# Patient Record
Sex: MALE | Race: WHITE | NOT HISPANIC OR LATINO
[De-identification: names, ages, dates, MRNs, and addresses within clinical notes are randomized per-mention and may not be internally consistent; named-entity substitution may affect disease eponyms.]

---

## 2017-01-10 ENCOUNTER — RX RENEWAL (OUTPATIENT)
Age: 72
End: 2017-01-10

## 2017-01-10 ENCOUNTER — APPOINTMENT (OUTPATIENT)
Dept: RHEUMATOLOGY | Facility: CLINIC | Age: 72
End: 2017-01-10

## 2017-01-10 DIAGNOSIS — I10 ESSENTIAL (PRIMARY) HYPERTENSION: ICD-10-CM

## 2017-01-30 ENCOUNTER — RX RENEWAL (OUTPATIENT)
Age: 72
End: 2017-01-30

## 2017-04-18 ENCOUNTER — RX RENEWAL (OUTPATIENT)
Age: 72
End: 2017-04-18

## 2017-07-09 ENCOUNTER — FORM ENCOUNTER (OUTPATIENT)
Age: 72
End: 2017-07-09

## 2017-07-10 ENCOUNTER — OUTPATIENT (OUTPATIENT)
Dept: OUTPATIENT SERVICES | Facility: HOSPITAL | Age: 72
LOS: 1 days | End: 2017-07-10
Payer: MEDICARE

## 2017-07-10 ENCOUNTER — APPOINTMENT (OUTPATIENT)
Dept: MRI IMAGING | Facility: CLINIC | Age: 72
End: 2017-07-10

## 2017-07-10 DIAGNOSIS — Z00.8 ENCOUNTER FOR OTHER GENERAL EXAMINATION: ICD-10-CM

## 2017-07-10 DIAGNOSIS — Z98.89 OTHER SPECIFIED POSTPROCEDURAL STATES: Chronic | ICD-10-CM

## 2017-07-10 PROCEDURE — 72148 MRI LUMBAR SPINE W/O DYE: CPT

## 2017-07-20 ENCOUNTER — OUTPATIENT (OUTPATIENT)
Dept: OUTPATIENT SERVICES | Facility: HOSPITAL | Age: 72
LOS: 1 days | Discharge: HOME | End: 2017-07-20
Payer: MEDICARE

## 2017-07-20 DIAGNOSIS — Z98.89 OTHER SPECIFIED POSTPROCEDURAL STATES: Chronic | ICD-10-CM

## 2017-07-20 DIAGNOSIS — M54.16 RADICULOPATHY, LUMBAR REGION: ICD-10-CM

## 2017-07-20 PROCEDURE — 77003 FLUOROGUIDE FOR SPINE INJECT: CPT

## 2017-07-20 PROCEDURE — 62323 NJX INTERLAMINAR LMBR/SAC: CPT

## 2017-07-27 ENCOUNTER — OUTPATIENT (OUTPATIENT)
Dept: OUTPATIENT SERVICES | Facility: HOSPITAL | Age: 72
LOS: 1 days | End: 2017-07-27
Payer: MEDICARE

## 2017-07-27 DIAGNOSIS — M54.16 RADICULOPATHY, LUMBAR REGION: ICD-10-CM

## 2017-07-27 DIAGNOSIS — Z98.89 OTHER SPECIFIED POSTPROCEDURAL STATES: Chronic | ICD-10-CM

## 2017-07-27 PROCEDURE — 77003 FLUOROGUIDE FOR SPINE INJECT: CPT

## 2017-07-27 PROCEDURE — 62323 NJX INTERLAMINAR LMBR/SAC: CPT

## 2017-09-15 ENCOUNTER — EMERGENCY (EMERGENCY)
Facility: HOSPITAL | Age: 72
LOS: 0 days | Discharge: HOME | End: 2017-09-16
Attending: EMERGENCY MEDICINE | Admitting: EMERGENCY MEDICINE
Payer: MEDICARE

## 2017-09-15 VITALS
WEIGHT: 179.9 LBS | SYSTOLIC BLOOD PRESSURE: 159 MMHG | DIASTOLIC BLOOD PRESSURE: 86 MMHG | RESPIRATION RATE: 18 BRPM | OXYGEN SATURATION: 100 % | TEMPERATURE: 98 F | HEART RATE: 66 BPM | HEIGHT: 66.5 IN

## 2017-09-15 DIAGNOSIS — K08.89 OTHER SPECIFIED DISORDERS OF TEETH AND SUPPORTING STRUCTURES: ICD-10-CM

## 2017-09-15 DIAGNOSIS — Z98.89 OTHER SPECIFIED POSTPROCEDURAL STATES: Chronic | ICD-10-CM

## 2017-09-15 PROCEDURE — 99283 EMERGENCY DEPT VISIT LOW MDM: CPT | Mod: 25

## 2017-09-15 NOTE — ED ADULT TRIAGE NOTE - CHIEF COMPLAINT QUOTE
left lower tooth pain. started at 10pm saw dentist yesterday and he filed down tooth which felt better after, no abscess per dentist. all of a sudden tonight tooth hurts again left lower tooth pain. started at 10pm saw dentist yesterday and he filed down tooth which felt better after, no abscess per dentist. all of a sudden tonight tooth hurts again. took 5mg oxycodone 30 minutes PTA

## 2017-09-16 VITALS
RESPIRATION RATE: 16 BRPM | OXYGEN SATURATION: 99 % | TEMPERATURE: 98 F | SYSTOLIC BLOOD PRESSURE: 142 MMHG | DIASTOLIC BLOOD PRESSURE: 81 MMHG | HEART RATE: 72 BPM

## 2017-09-16 RX ORDER — MORPHINE SULFATE 50 MG/1
6 CAPSULE, EXTENDED RELEASE ORAL ONCE
Qty: 0 | Refills: 0 | Status: DISCONTINUED | OUTPATIENT
Start: 2017-09-16 | End: 2017-09-16

## 2017-09-16 RX ADMIN — MORPHINE SULFATE 6 MILLIGRAM(S): 50 CAPSULE, EXTENDED RELEASE ORAL at 00:33

## 2017-09-16 NOTE — ED PROVIDER NOTE - MUSCULOSKELETAL, MLM
Spine appears normal, range of motion is not limited, no muscle or joint tenderness.  CASTANO x 4.  No focal swelling, tender.

## 2017-09-16 NOTE — ED PROVIDER NOTE - PROGRESS NOTE DETAILS
Dr. Menon:  Pt declines offer of dental block w/ bupivacaine, requests injection of morphine for pain control. Dr. Menon:  Reevaluated patient at bedside.  Patient feeling much improved, requests D/C.  Discussed the results of all diagnostic testing in ED and copies of all reports given.   An opportunity to ask questions was given.  Discussed the importance of prompt, close medical follow-up.  Patient will return with any changes, concerns or persistent / worsening symptoms.  Understanding of all instructions verbalized.

## 2017-09-16 NOTE — ED ADULT NURSE NOTE - OBJECTIVE STATEMENT
presents to the ED with left dental pain. states that he saw his dentist 2 days ago and everything was negative. pain was tolerable for the last day but became increasly worse this evening.

## 2017-09-16 NOTE — ED PROVIDER NOTE - CONSTITUTIONAL, MLM
normal... Elderly WM, awake, alert, oriented to person, place, time/situation, no respiratory discomfort, + moderate acute distress due to pain, + tearful.

## 2017-09-16 NOTE — ED PROVIDER NOTE - CHPI ED SYMPTOMS NEG
no nasal congestion/no fever/no bleeding gums/no headache/no ear pain/no decreased eating/drinking/no mouth sores

## 2017-09-16 NOTE — ED ADULT NURSE NOTE - CHIEF COMPLAINT QUOTE
left lower tooth pain. started at 10pm saw dentist yesterday and he filed down tooth which felt better after, no abscess per dentist. all of a sudden tonight tooth hurts again. took 5mg oxycodone 30 minutes PTA

## 2017-09-16 NOTE — ED PROVIDER NOTE - PMH
BPH (benign prostatic hyperplasia)    CAD (coronary artery disease)    Herniated lumbar intervertebral disc    Hypertension    Mitral valve regurgitation    Osteoarthrosis    Sarcoidosis    Sepsis  unrosepsis

## 2017-09-16 NOTE — ED PROVIDER NOTE - DIAGNOSIS COUNSELING, MDM
conducted a detailed discussion... I had a detailed discussion with the patient and wife regarding the historical points, exam findings, and any diagnostic results supporting the admit diagnosis.

## 2017-09-16 NOTE — ED PROVIDER NOTE - MEDICAL DECISION MAKING DETAILS
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   No known injury, prior dental work.  Panorex by DDS earlier same day negative, + relief after tooth filed but pain recurred after dinner, no relief by po oxycodone.  L lower premolar/ 1st molar + TTP, gingiva normal.  Plan: IV morphine, pt declined dental block w/ bupivicaine.  Observe, reassess.

## 2017-09-16 NOTE — ED PROVIDER NOTE - OBJECTIVE STATEMENT
Exam begun at 00:00. Exam begun at 00:00.  ED chart completed .  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  Gradual onset pain L lower tooth, #19, progressively worsening since .  Partial alleviation by po oxycodone.  DDS evaluation earlier today: Panorex done: no abscess obvious, DDS filed down the tooth somewhat w/ + sympt. relief.  No h/o tooth grinding nor any recent dental work done prior.  Pain recurred ~ 9:30 PM after returnd home from dinner (no hard/crunchy foods taken).  Pt reports severe, sharp/shooting pain from the same tooth, no relief tonight by po oxycodone (+ 5 yrs. ).   Alls: PCN, sulfa.  PCP: CASI Jacobson.

## 2017-09-16 NOTE — ED PROVIDER NOTE - ENMT, MLM
Airway patent, Nasal mucosa clear. Mouth with normal mucosa. Throat has no vesicles, no oropharyngeal exudates and uvula is midline.  No facial swelling.  L lower premolar: w/ + metal cap in place, + percussive tender; 1st molar also + TTP, surrounding gingiva no swelling/ discoloration/ bleeding.

## 2017-09-16 NOTE — ED PROVIDER NOTE - NEUROLOGICAL, MLM
Alert and oriented, no focal deficits, no motor or sensory deficits.  CNs II - XII intact.  Normal speech.

## 2017-12-11 ENCOUNTER — RX RENEWAL (OUTPATIENT)
Age: 72
End: 2017-12-11

## 2017-12-11 DIAGNOSIS — H10.13 ACUTE ATOPIC CONJUNCTIVITIS, BILATERAL: ICD-10-CM

## 2018-01-25 DIAGNOSIS — Z87.09 PERSONAL HISTORY OF OTHER DISEASES OF THE RESPIRATORY SYSTEM: ICD-10-CM

## 2018-12-04 ENCOUNTER — RX RENEWAL (OUTPATIENT)
Age: 73
End: 2018-12-04

## 2019-03-15 DIAGNOSIS — M12.9 ARTHROPATHY, UNSPECIFIED: ICD-10-CM

## 2019-05-15 ENCOUNTER — OTHER (OUTPATIENT)
Age: 74
End: 2019-05-15

## 2019-05-17 ENCOUNTER — APPOINTMENT (OUTPATIENT)
Dept: NEUROSURGERY | Facility: CLINIC | Age: 74
End: 2019-05-17
Payer: MEDICARE

## 2019-05-17 DIAGNOSIS — M51.9 UNSPECIFIED THORACIC, THORACOLUMBAR AND LUMBOSACRAL INTERVERTEBRAL DISC DISORDER: ICD-10-CM

## 2019-05-17 DIAGNOSIS — Z78.9 OTHER SPECIFIED HEALTH STATUS: ICD-10-CM

## 2019-05-17 DIAGNOSIS — M54.16 RADICULOPATHY, LUMBAR REGION: ICD-10-CM

## 2019-05-17 PROCEDURE — 99203 OFFICE O/P NEW LOW 30 MIN: CPT

## 2019-05-21 ENCOUNTER — OUTPATIENT (OUTPATIENT)
Dept: OUTPATIENT SERVICES | Facility: HOSPITAL | Age: 74
LOS: 1 days | End: 2019-05-21
Payer: MEDICARE

## 2019-05-21 DIAGNOSIS — Z98.89 OTHER SPECIFIED POSTPROCEDURAL STATES: Chronic | ICD-10-CM

## 2019-05-21 DIAGNOSIS — M54.16 RADICULOPATHY, LUMBAR REGION: ICD-10-CM

## 2019-05-21 PROCEDURE — 77003 FLUOROGUIDE FOR SPINE INJECT: CPT

## 2019-05-21 PROCEDURE — 62323 NJX INTERLAMINAR LMBR/SAC: CPT

## 2019-05-23 VITALS
TEMPERATURE: 98.3 F | HEART RATE: 69 BPM | HEIGHT: 65.35 IN | SYSTOLIC BLOOD PRESSURE: 177 MMHG | DIASTOLIC BLOOD PRESSURE: 75 MMHG | WEIGHT: 220.2 LBS | BODY MASS INDEX: 36.25 KG/M2

## 2019-05-23 PROBLEM — Z78.9 NO PERTINENT PAST MEDICAL HISTORY: Status: RESOLVED | Noted: 2019-05-23 | Resolved: 2019-05-23

## 2019-05-23 PROBLEM — Z78.9 ALCOHOL INGESTION: Status: ACTIVE | Noted: 2019-05-23

## 2019-05-23 PROBLEM — M51.9 LUMBAR DISC DISEASE: Status: ACTIVE | Noted: 2019-05-15

## 2019-05-23 NOTE — FAMILY HISTORY
[] :  [Alive] : alive [FreeTextEntry1] : multiple myeloma, myesthenia gravis [FreeTextEntry2] : systemic sclerosis

## 2019-05-23 NOTE — REASON FOR VISIT
[New Patient Visit] : a new patient visit [FreeTextEntry1] : past history of lumbar disc herniation. New acute left sciatica after golfing.

## 2019-05-23 NOTE — CONSULT LETTER
[Dear  ___] : Dear  [unfilled], [Sincerely,] : Sincerely, [FreeTextEntry2] : Tyler Jacobson\[facility] [address] [place] [id number] [FreeTextEntry1] : I have seen your patient Anam Frias in my office to evaluate his recent problem with acute sharp lower back pain with radiation down the left leg. 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 The patient reports onset of current symptoms approximately 10 days ago after playing 9 holes of golf. He has been troubled with shooting stabbing pains from the lower back into the buttock region and down the leg to the foot. He reports numbness in the toes on the left-hand side. He has a weakness. He denies any changes in bowel and bladder function. He has been started on Neurontin 200 mg 3 times daily and a Medrol Dosepak. He is about 4 days into this treatment and not yet received significant benefit. The patient indicates his pain is currently a 7 to a 9/10\par \par I have reviewed with the patient and his recent MRI images which identify a degenerative spondylotic changes from L2-S1. The previous disc herniation at L4-5 it is identified. There is some left lateral recess compromise. In addition there is evidence of a disc herniation at L5-S1.\par \par On examination of the patient is clearly in distress with lower back pain and radiating symptoms to the buttock region on the left. The patient has some pain to palpation in this region. There is a positive straight leg test on the left. Flexion and external rotation does not specifically increase his pain. Internal rotation does cause sharp pain. There is some moderate pain to palpation of the iliotibial band on the left-hand side. The patient has specifically decreased sensation in the top of the foot especially the first 2 toes on the left-hand side. There is weakness of extensor hallucis longus on the left-hand side. The patient's reflexes are symmetric and normal at the patella there is an absent left ankle reflex. There is no clonus.\par \par I've had an extended discussion with the patient regarding his treatment options. We have reviewed the natural history of a disc herniation. I've reassured the patient that 90% of acute disc herniations with sciatica will resolve spontaneously within 6-12 weeks. In the interval use of pain medication, a muscle relaxant, gabapentin, and steroids may all be of benefit. If the patient does not notice steady progressive improvement the use of an epidural steroid injection may also be of assistance. Currently I would not recommend surgical intervention but this may be necessary if symptoms do not resolve after appropriate conservative treatment over a 12-16 week. And if sensory and strength symptoms persist. Currently I do not recommend active physical therapy until his acute pain is slightly improved. I will see the patient again in 2 weeks to evaluate his progress and response to care.\par \par Thank you for very kindly including me in the evaluation and treatment of your patient. Please do not hesitate to contact me should any questions or concerns regarding this evaluation, the diagnosis, where the recommendation for current conservative care measures. [FreeTextEntry3] : Edison Charles MD, PhD\par  of Neurosurgery\par [facility]\par \par \par

## 2019-12-10 DIAGNOSIS — Z00.00 ENCOUNTER FOR GENERAL ADULT MEDICAL EXAMINATION W/OUT ABNORMAL FINDINGS: ICD-10-CM

## 2019-12-10 DIAGNOSIS — M06.4 INFLAMMATORY POLYARTHROPATHY: ICD-10-CM

## 2019-12-12 ENCOUNTER — TRANSCRIPTION ENCOUNTER (OUTPATIENT)
Age: 74
End: 2019-12-12

## 2019-12-13 LAB
24R-OH-CALCIDIOL SERPL-MCNC: 47.4 PG/ML
25(OH)D3 SERPL-MCNC: 55.8 NG/ML
ACE BLD-CCNC: 35 U/L
ALBUMIN MFR SERPL ELPH: 67.6 %
ALBUMIN SERPL ELPH-MCNC: 4.7 G/DL
ALBUMIN SERPL-MCNC: 4.5 G/DL
ALBUMIN/GLOB SERPL: 2 RATIO
ALP BLD-CCNC: 50 U/L
ALPHA1 GLOB MFR SERPL ELPH: 3.4 %
ALPHA1 GLOB SERPL ELPH-MCNC: 0.2 G/DL
ALPHA2 GLOB MFR SERPL ELPH: 7.7 %
ALPHA2 GLOB SERPL ELPH-MCNC: 0.5 G/DL
ALT SERPL-CCNC: 16 U/L
ANION GAP SERPL CALC-SCNC: 17 MMOL/L
AST SERPL-CCNC: 26 U/L
B-GLOBULIN MFR SERPL ELPH: 10.3 %
B-GLOBULIN SERPL ELPH-MCNC: 0.7 G/DL
BASOPHILS # BLD AUTO: 0.04 K/UL
BASOPHILS NFR BLD AUTO: 0.7 %
BILIRUB SERPL-MCNC: 0.6 MG/DL
BUN SERPL-MCNC: 15 MG/DL
C3 SERPL-MCNC: 96 MG/DL
C4 SERPL-MCNC: 22 MG/DL
CALCIUM SERPL-MCNC: 9.7 MG/DL
CENTROMERE IGG SER-ACNC: <0.2 CD:130001892
CHLORIDE SERPL-SCNC: 102 MMOL/L
CHOLEST SERPL-MCNC: 193 MG/DL
CHOLEST/HDLC SERPL: 2.1 RATIO
CK SERPL-CCNC: 61 U/L
CO2 SERPL-SCNC: 27 MMOL/L
CREAT SERPL-MCNC: 1.01 MG/DL
CRP SERPL-MCNC: 0.68 MG/DL
DSDNA AB SER-ACNC: <12 IU/ML
ENA RNP AB SER IA-ACNC: >8 AL
ENA SCL70 IGG SER IA-ACNC: 1.7 AL
ENA SM AB SER IA-ACNC: <0.2 AL
ENA SS-A AB SER IA-ACNC: 0.2 AL
ENA SS-B AB SER IA-ACNC: <0.2 AL
EOSINOPHIL # BLD AUTO: 0.2 K/UL
EOSINOPHIL NFR BLD AUTO: 3.3 %
ERYTHROCYTE [SEDIMENTATION RATE] IN BLOOD BY WESTERGREN METHOD: 32 MM/HR
ESTIMATED AVERAGE GLUCOSE: 103 MG/DL
FERRITIN SERPL-MCNC: 341 NG/ML
GAMMA GLOB FLD ELPH-MCNC: 0.7 G/DL
GAMMA GLOB MFR SERPL ELPH: 11 %
GLUCOSE SERPL-MCNC: 90 MG/DL
HBA1C MFR BLD HPLC: 5.2 %
HCT VFR BLD CALC: 41.1 %
HDLC SERPL-MCNC: 94 MG/DL
HGB BLD-MCNC: 14 G/DL
IMM GRANULOCYTES NFR BLD AUTO: 0.5 %
INTERPRETATION SERPL IEP-IMP: NORMAL
LDLC SERPL CALC-MCNC: 84 MG/DL
LYMPHOCYTES # BLD AUTO: 1.53 K/UL
LYMPHOCYTES NFR BLD AUTO: 25.2 %
MAN DIFF?: NORMAL
MCHC RBC-ENTMCNC: 34 PG
MCHC RBC-ENTMCNC: 34.1 GM/DL
MCV RBC AUTO: 99.8 FL
MONOCYTES # BLD AUTO: 0.78 K/UL
MONOCYTES NFR BLD AUTO: 12.8 %
NEUTROPHILS # BLD AUTO: 3.5 K/UL
NEUTROPHILS NFR BLD AUTO: 57.5 %
PLATELET # BLD AUTO: 206 K/UL
POTASSIUM SERPL-SCNC: 3.8 MMOL/L
PROT SERPL-MCNC: 6.7 G/DL
PSA FREE FLD-MCNC: 18 %
PSA FREE SERPL-MCNC: 0.54 NG/ML
PSA SERPL-MCNC: 3.07 NG/ML
RBC # BLD: 4.12 M/UL
RBC # FLD: 13.2 %
RHEUMATOID FACT SER QL: 10 IU/ML
SODIUM SERPL-SCNC: 146 MMOL/L
TRIGL SERPL-MCNC: 77 MG/DL
TSH SERPL-ACNC: 2.17 UIU/ML
URATE SERPL-MCNC: 6.1 MG/DL
WBC # FLD AUTO: 6.08 K/UL

## 2019-12-19 LAB
ANA PAT FLD IF-IMP: ABNORMAL
ANA SER IF-ACNC: ABNORMAL
CCP AB SER IA-ACNC: 9 UNITS
RF+CCP IGG SER-IMP: NEGATIVE

## 2019-12-24 ENCOUNTER — RX RENEWAL (OUTPATIENT)
Age: 74
End: 2019-12-24

## 2019-12-24 RX ORDER — LOSARTAN POTASSIUM 100 MG/1
100 TABLET, FILM COATED ORAL
Qty: 90 | Refills: 1 | Status: ACTIVE | COMMUNITY
Start: 2019-12-24 | End: 1900-01-01

## 2019-12-24 RX ORDER — ATENOLOL 50 MG/1
50 TABLET ORAL
Qty: 90 | Refills: 1 | Status: ACTIVE | COMMUNITY
Start: 2017-01-10 | End: 1900-01-01

## 2019-12-24 RX ORDER — DOXAZOSIN 4 MG/1
4 TABLET ORAL DAILY
Qty: 90 | Refills: 3 | Status: ACTIVE | COMMUNITY
Start: 2019-12-24 | End: 1900-01-01

## 2020-11-17 ENCOUNTER — OUTPATIENT (OUTPATIENT)
Dept: OUTPATIENT SERVICES | Facility: HOSPITAL | Age: 75
LOS: 1 days | End: 2020-11-17
Payer: MEDICARE

## 2020-11-17 DIAGNOSIS — Z11.59 ENCOUNTER FOR SCREENING FOR OTHER VIRAL DISEASES: ICD-10-CM

## 2020-11-17 DIAGNOSIS — Z98.89 OTHER SPECIFIED POSTPROCEDURAL STATES: Chronic | ICD-10-CM

## 2020-11-17 LAB — SARS-COV-2 RNA SPEC QL NAA+PROBE: SIGNIFICANT CHANGE UP

## 2020-11-17 PROCEDURE — U0003: CPT

## 2020-11-19 ENCOUNTER — OUTPATIENT (OUTPATIENT)
Dept: OUTPATIENT SERVICES | Facility: HOSPITAL | Age: 75
LOS: 1 days | End: 2020-11-19
Payer: MEDICARE

## 2020-11-19 DIAGNOSIS — M54.16 RADICULOPATHY, LUMBAR REGION: ICD-10-CM

## 2020-11-19 DIAGNOSIS — Z98.89 OTHER SPECIFIED POSTPROCEDURAL STATES: Chronic | ICD-10-CM

## 2020-11-19 PROCEDURE — 62323 NJX INTERLAMINAR LMBR/SAC: CPT

## 2020-12-16 PROBLEM — Z87.09 HISTORY OF INFLUENZA: Status: RESOLVED | Noted: 2018-01-25 | Resolved: 2020-12-16

## 2021-01-14 RX ORDER — AMLODIPINE BESYLATE 10 MG/1
10 TABLET ORAL
Qty: 90 | Refills: 3 | Status: ACTIVE | COMMUNITY
Start: 2017-01-10 | End: 1900-01-01

## 2021-02-01 ENCOUNTER — RX RENEWAL (OUTPATIENT)
Age: 76
End: 2021-02-01

## 2021-02-01 RX ORDER — FINASTERIDE 5 MG/1
5 TABLET, FILM COATED ORAL DAILY
Qty: 90 | Refills: 2 | Status: ACTIVE | COMMUNITY
Start: 2017-04-18 | End: 1900-01-01

## 2021-03-10 NOTE — ED PROVIDER NOTE - TEMPLATE, MLM
Problem: Nutritional:  Goal: Achieve adequate nutritional intake  Description: Patient will consume >50% of meals  Outcome: PROGRESSING AS EXPECTED      Dental

## 2021-08-17 ENCOUNTER — TRANSCRIPTION ENCOUNTER (OUTPATIENT)
Age: 76
End: 2021-08-17

## 2023-04-18 ENCOUNTER — OFFICE (OUTPATIENT)
Dept: URBAN - METROPOLITAN AREA CLINIC 102 | Facility: CLINIC | Age: 78
Setting detail: OPHTHALMOLOGY
End: 2023-04-18
Payer: MEDICARE

## 2023-04-18 DIAGNOSIS — H25.13: ICD-10-CM

## 2023-04-18 DIAGNOSIS — H40.033: ICD-10-CM

## 2023-04-18 DIAGNOSIS — H43.811: ICD-10-CM

## 2023-04-18 DIAGNOSIS — H35.363: ICD-10-CM

## 2023-04-18 DIAGNOSIS — H16.223: ICD-10-CM

## 2023-04-18 DIAGNOSIS — H40.023: ICD-10-CM

## 2023-04-18 PROCEDURE — 92014 COMPRE OPH EXAM EST PT 1/>: CPT | Performed by: OPHTHALMOLOGY

## 2023-04-18 PROCEDURE — 92020 GONIOSCOPY: CPT | Performed by: OPHTHALMOLOGY

## 2023-04-18 PROCEDURE — 92134 CPTRZ OPH DX IMG PST SGM RTA: CPT | Performed by: OPHTHALMOLOGY

## 2023-04-18 ASSESSMENT — REFRACTION_AUTOREFRACTION
OD_SPHERE: -0.75
OD_CYLINDER: -1.25
OS_CYLINDER: -1.75
OD_AXIS: 046
OS_SPHERE: -0.50
OS_AXIS: 113

## 2023-04-18 ASSESSMENT — TONOMETRY
OD_IOP_MMHG: 15
OD_IOP_MMHG: 21
OS_IOP_MMHG: 21
OS_IOP_MMHG: 16

## 2023-04-18 ASSESSMENT — REFRACTION_CURRENTRX
OS_SPHERE: -0.50
OD_CYLINDER: -1.25
OS_ADD: +2.50
OD_SPHERE: -1.00
OS_OVR_VA: 20/
OD_CYLINDER: -1.25
OD_ADD: +2.50
OD_OVR_VA: 20/
OS_AXIS: 124
OD_AXIS: 90
OD_ADD: +2.50
OS_CYLINDER: -2.00
OD_VPRISM_DIRECTION: PROGS
OS_SPHERE: -1.00
OS_AXIS: 110
OS_AXIS: 120
OD_ADD: +2.50
OS_VPRISM_DIRECTION: PROGS
OD_SPHERE: -0.75
OS_VPRISM_DIRECTION: PROGS
OS_SPHERE: -0.50
OS_ADD: +2.50
OS_OVR_VA: 20/
OS_ADD: +2.50
OD_VPRISM_DIRECTION: PROGS
OD_CYLINDER: -1.25
OS_CYLINDER: -1.00
OD_OVR_VA: 20/
OD_OVR_VA: 20/
OD_SPHERE: -1.25
OD_AXIS: 65
OS_OVR_VA: 20/
OD_AXIS: 80
OS_CYLINDER: -2.00

## 2023-04-18 ASSESSMENT — SPHEQUIV_DERIVED
OD_SPHEQUIV: -1.625
OS_SPHEQUIV: -1.375
OS_SPHEQUIV: -1.5
OD_SPHEQUIV: -1.375
OD_SPHEQUIV: -1.5
OS_SPHEQUIV: -1.75
OS_SPHEQUIV: -1.375
OD_SPHEQUIV: -1.625

## 2023-04-18 ASSESSMENT — REFRACTION_MANIFEST
OS_AXIS: 120
OS_AXIS: 109
OD_SPHERE: -1.00
OD_SPHERE: -0.75
OD_VA1: 20/20
OD_AXIS: 75
OS_SPHERE: -0.50
OD_VA1: 20/25
OD_CYLINDER: -1.25
OS_ADD: +2.50
OS_VA1: 20/25
OS_AXIS: 120
OD_SPHERE: -1.00
OS_SPHERE: -0.50
OD_AXIS: 80
OS_CYLINDER: -1.75
OD_ADD: +2.50
OD_CYLINDER: -1.25
OD_ADD: +2.50
OS_CYLINDER: -2.00
OS_SPHERE: -0.75
OS_CYLINDER: -2.00
OS_VA1: 20/40
OS_VA1: 20/25
OD_AXIS: 80
OS_ADD: +2.50
OD_CYLINDER: -1.50

## 2023-04-18 ASSESSMENT — PACHYMETRY
OD_CT_UM: 563
OD_CT_CORRECTION: -1
OS_CT_UM: 565
OS_CT_CORRECTION: -1

## 2023-04-18 ASSESSMENT — AXIALLENGTH_DERIVED
OD_AL: 24.0025
OD_AL: 23.9522
OS_AL: 23.7615
OS_AL: 23.9107
OS_AL: 23.7615
OD_AL: 24.0025
OS_AL: 23.811
OD_AL: 23.9021

## 2023-04-18 ASSESSMENT — KERATOMETRY
OD_AXISANGLE_DEGREES: 164
OD_K1POWER_DIOPTERS: 43.75
OS_K1POWER_DIOPTERS: 44.00
OS_AXISANGLE_DEGREES: 029
METHOD_AUTO_MANUAL: AUTO
OD_K2POWER_DIOPTERS: 44.50
OS_K2POWER_DIOPTERS: 45.00

## 2023-04-18 ASSESSMENT — VISUAL ACUITY
OS_BCVA: 20/40
OD_BCVA: 20/50

## 2023-04-18 ASSESSMENT — CONFRONTATIONAL VISUAL FIELD TEST (CVF)
OD_FINDINGS: FULL
OS_FINDINGS: FULL

## 2023-04-18 ASSESSMENT — TEAR BREAK UP TIME (TBUT)
OS_TBUT: 2+
OD_TBUT: 1+

## 2023-06-09 DIAGNOSIS — M25.562 PAIN IN LEFT KNEE: ICD-10-CM

## 2023-06-09 DIAGNOSIS — M23.305 OTHER MENISCUS DERANGEMENTS, UNSPECIFIED MEDIAL MENISCUS, UNSPECIFIED KNEE: ICD-10-CM

## 2023-06-22 ENCOUNTER — APPOINTMENT (OUTPATIENT)
Dept: MRI IMAGING | Facility: CLINIC | Age: 78
End: 2023-06-22
Payer: MEDICARE

## 2023-06-22 ENCOUNTER — OUTPATIENT (OUTPATIENT)
Dept: OUTPATIENT SERVICES | Facility: HOSPITAL | Age: 78
LOS: 1 days | End: 2023-06-22
Payer: MEDICARE

## 2023-06-22 DIAGNOSIS — M23.305 OTHER MENISCUS DERANGEMENTS, UNSPECIFIED MEDIAL MENISCUS, UNSPECIFIED KNEE: ICD-10-CM

## 2023-06-22 DIAGNOSIS — M25.562 PAIN IN LEFT KNEE: ICD-10-CM

## 2023-06-22 DIAGNOSIS — Z98.89 OTHER SPECIFIED POSTPROCEDURAL STATES: Chronic | ICD-10-CM

## 2023-06-22 PROCEDURE — 73721 MRI JNT OF LWR EXTRE W/O DYE: CPT | Mod: 26,LT,MH

## 2023-06-22 PROCEDURE — 73721 MRI JNT OF LWR EXTRE W/O DYE: CPT

## 2023-10-01 PROBLEM — M54.16 LUMBAR RADICULAR PAIN: Status: ACTIVE | Noted: 2017-07-10

## 2023-10-05 ENCOUNTER — APPOINTMENT (OUTPATIENT)
Dept: RADIOLOGY | Facility: CLINIC | Age: 78
End: 2023-10-05
Payer: MEDICARE

## 2023-10-05 ENCOUNTER — OUTPATIENT (OUTPATIENT)
Dept: OUTPATIENT SERVICES | Facility: HOSPITAL | Age: 78
LOS: 1 days | End: 2023-10-05
Payer: MEDICARE

## 2023-10-05 DIAGNOSIS — Z98.89 OTHER SPECIFIED POSTPROCEDURAL STATES: Chronic | ICD-10-CM

## 2023-10-05 DIAGNOSIS — Z00.8 ENCOUNTER FOR OTHER GENERAL EXAMINATION: ICD-10-CM

## 2023-10-05 PROCEDURE — 71046 X-RAY EXAM CHEST 2 VIEWS: CPT | Mod: 26

## 2023-10-05 PROCEDURE — 71046 X-RAY EXAM CHEST 2 VIEWS: CPT

## 2023-10-24 ENCOUNTER — APPOINTMENT (OUTPATIENT)
Dept: RHEUMATOLOGY | Facility: CLINIC | Age: 78
End: 2023-10-24
Payer: MEDICARE

## 2023-10-24 DIAGNOSIS — M47.819 SPONDYLOSIS W/OUT MYELOPATHY OR RADICULOPATHY, SITE UNSPECIFIED: ICD-10-CM

## 2023-10-24 PROCEDURE — 99442: CPT | Mod: 95

## 2023-10-31 ENCOUNTER — OFFICE (OUTPATIENT)
Dept: URBAN - METROPOLITAN AREA CLINIC 102 | Facility: CLINIC | Age: 78
Setting detail: OPHTHALMOLOGY
End: 2023-10-31
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
## 2023-10-31 PROCEDURE — NO SHOW FE NO SHOW FEE: Performed by: OPHTHALMOLOGY

## 2023-11-03 ENCOUNTER — LABORATORY RESULT (OUTPATIENT)
Age: 78
End: 2023-11-03

## 2023-11-05 LAB
24R-OH-CALCIDIOL SERPL-MCNC: 19.1 PG/ML
25(OH)D3 SERPL-MCNC: 42.4 NG/ML
ALBUMIN SERPL ELPH-MCNC: 4.3 G/DL
ALP BLD-CCNC: 58 U/L
ALT SERPL-CCNC: 29 U/L
ANION GAP SERPL CALC-SCNC: 11 MMOL/L
AST SERPL-CCNC: 29 U/L
BILIRUB SERPL-MCNC: 0.4 MG/DL
BUN SERPL-MCNC: 15 MG/DL
C3 SERPL-MCNC: 102 MG/DL
C4 SERPL-MCNC: 21 MG/DL
CALCIUM SERPL-MCNC: 9.5 MG/DL
CCP AB SER IA-ACNC: 8 UNITS
CHLORIDE SERPL-SCNC: 101 MMOL/L
CK SERPL-CCNC: 35 U/L
CO2 SERPL-SCNC: 32 MMOL/L
CREAT SERPL-MCNC: 1.07 MG/DL
CRP SERPL-MCNC: 7 MG/L
EGFR: 72 ML/MIN/1.73M2
ERYTHROCYTE [SEDIMENTATION RATE] IN BLOOD BY WESTERGREN METHOD: 38 MM/HR
FERRITIN SERPL-MCNC: 365 NG/ML
GLUCOSE SERPL-MCNC: 100 MG/DL
HAV IGM SER QL: NONREACTIVE
HBV CORE IGM SER QL: NONREACTIVE
HBV SURFACE AG SER QL: NONREACTIVE
HCT VFR BLD CALC: 44.8 %
HCV AB SER QL: NONREACTIVE
HCV S/CO RATIO: 0.11 S/CO
HGB BLD-MCNC: 15.2 G/DL
MCHC RBC-ENTMCNC: 33.9 GM/DL
MCHC RBC-ENTMCNC: 34.6 PG
MCV RBC AUTO: 102.1 FL
PLATELET # BLD AUTO: 206 K/UL
POTASSIUM SERPL-SCNC: 3.5 MMOL/L
PROT SERPL-MCNC: 6.3 G/DL
RBC # BLD: 4.39 M/UL
RBC # FLD: 12.6 %
RF+CCP IGG SER-IMP: NEGATIVE
RHEUMATOID FACT SER QL: <10 IU/ML
SODIUM SERPL-SCNC: 144 MMOL/L
TSH SERPL-ACNC: 1.75 UIU/ML
WBC # FLD AUTO: 9.68 K/UL

## 2023-11-09 ENCOUNTER — APPOINTMENT (OUTPATIENT)
Dept: RHEUMATOLOGY | Facility: CLINIC | Age: 78
End: 2023-11-09
Payer: MEDICARE

## 2023-11-09 VITALS
BODY MASS INDEX: 38.98 KG/M2 | DIASTOLIC BLOOD PRESSURE: 70 MMHG | SYSTOLIC BLOOD PRESSURE: 130 MMHG | TEMPERATURE: 97.8 F | HEIGHT: 63 IN | HEART RATE: 68 BPM | WEIGHT: 220 LBS | OXYGEN SATURATION: 98 %

## 2023-11-09 PROCEDURE — 96372 THER/PROPH/DIAG INJ SC/IM: CPT

## 2023-11-09 PROCEDURE — 99215 OFFICE O/P EST HI 40 MIN: CPT | Mod: 25

## 2023-11-09 RX ORDER — METHYLPRED ACET/NACL,ISO-OS/PF 80 MG/ML
80 VIAL (ML) INJECTION
Qty: 1 | Refills: 0 | Status: COMPLETED | OUTPATIENT
Start: 2023-11-09

## 2023-11-09 RX ADMIN — METHYLPREDNISOLONE ACETATE MG/ML: 80 INJECTION, SUSPENSION INTRA-ARTICULAR; INTRALESIONAL; INTRAMUSCULAR; SOFT TISSUE at 00:00

## 2023-11-10 LAB — ENA SCL70 IGG SER IA-ACNC: 1.3 AL

## 2023-11-10 RX ORDER — OSELTAMIVIR PHOSPHATE 75 MG/1
75 CAPSULE ORAL TWICE DAILY
Qty: 10 | Refills: 3 | Status: DISCONTINUED | COMMUNITY
Start: 2018-01-25 | End: 2023-11-10

## 2023-11-10 RX ORDER — PREDNISONE 20 MG/1
20 TABLET ORAL
Qty: 10 | Refills: 3 | Status: DISCONTINUED | COMMUNITY
Start: 2019-12-12 | End: 2023-11-10

## 2023-11-10 RX ORDER — FOLIC ACID 1 MG/1
1 TABLET ORAL DAILY
Qty: 90 | Refills: 3 | Status: ACTIVE | COMMUNITY
Start: 2023-11-10 | End: 1900-01-01

## 2023-11-10 RX ORDER — AZELASTINE HYDROCHLORIDE 137 UG/1
0.1 SPRAY, METERED NASAL DAILY
Qty: 3 | Refills: 6 | Status: DISCONTINUED | COMMUNITY
Start: 2018-12-04 | End: 2023-11-10

## 2023-11-10 RX ORDER — ALPRAZOLAM 0.5 MG/1
0.5 TABLET ORAL
Qty: 2 | Refills: 0 | Status: DISCONTINUED | COMMUNITY
Start: 2017-07-10 | End: 2023-11-10

## 2023-11-10 RX ORDER — CETIRIZINE HYDROCHLORIDE 10 MG/1
10 TABLET, COATED ORAL
Refills: 0 | Status: ACTIVE | OUTPATIENT
Start: 2023-11-10 | End: 1900-01-01

## 2023-11-10 RX ORDER — GABAPENTIN 100 MG/1
100 CAPSULE ORAL 3 TIMES DAILY
Qty: 126 | Refills: 1 | Status: DISCONTINUED | COMMUNITY
Start: 2019-05-15 | End: 2023-11-10

## 2023-11-10 RX ORDER — GOLIMUMAB 50 MG/4ML
50 SOLUTION INTRAVENOUS
Refills: 0 | Status: ACTIVE | OUTPATIENT
Start: 2023-11-10 | End: 1900-01-01

## 2023-11-10 RX ORDER — METHYLPREDNISOLONE 40 MG/ML
40 INJECTION, POWDER, LYOPHILIZED, FOR SOLUTION INTRAMUSCULAR; INTRAVENOUS
Refills: 0 | Status: ACTIVE | OUTPATIENT
Start: 2023-11-10 | End: 1900-01-01

## 2023-11-10 RX ORDER — OLOPATADINE HCL 1 MG/ML
0.1 SOLUTION/ DROPS OPHTHALMIC TWICE DAILY
Qty: 1 | Refills: 3 | Status: DISCONTINUED | COMMUNITY
Start: 2017-12-11 | End: 2023-11-10

## 2023-11-10 RX ORDER — METHYLPREDNISOLONE 4 MG/1
4 TABLET ORAL
Qty: 1 | Refills: 1 | Status: DISCONTINUED | COMMUNITY
Start: 2019-05-15 | End: 2023-11-10

## 2023-11-10 RX ADMIN — Medication 0 MG: at 00:00

## 2023-11-10 RX ADMIN — METHYLPREDNISOLONE 1 MG: 40 INJECTION, POWDER, LYOPHILIZED, FOR SOLUTION INTRAMUSCULAR; INTRAVENOUS at 00:00

## 2023-11-10 RX ADMIN — CETIRIZINE HYDROCHLORIDE 1 MG: 10 TABLET, COATED ORAL at 00:00

## 2023-11-10 RX ADMIN — GOLIMUMAB 0 MG/4ML: 50 SOLUTION INTRAVENOUS at 00:00

## 2023-11-16 ENCOUNTER — LABORATORY RESULT (OUTPATIENT)
Age: 78
End: 2023-11-16

## 2023-11-20 LAB
AA PROT SER-MCNC: 9.5 UG/ML
ACE BLD-CCNC: 32 U/L
ANAPLASMA PHAGOCYTOPHILIA IGG ANTIBODIES: NEGATIVE
ANAPLASMA PHAGOCYTOPHILIA IGM ANTIBODIES: NEGATIVE
BABESIA ANTIBODIES, IGG: NORMAL
BABESIA ANTIBODIES, IGM: NORMAL
BABESIA RESULT COMMENT: NORMAL
BIOMARKER COMMENT: NORMAL
CENTROMERE IGG SER-ACNC: <0.2 AL
CRP SERPL-MCNC: 7 MG/L
DSDNA AB SER-ACNC: 14 IU/ML
EGF SERPL-MCNC: 100 PG/ML
EHRLICIA CHAFFEENIS IGG ANTIBODIES: NEGATIVE
EHRLICIA CHAFFEENIS IGM ANTIBODIES: NEGATIVE
ENA RNP AB SER IA-ACNC: 7.8 AL
ENA SM AB SER IA-ACNC: <0.2 AL
ERHLICIA RESULT COMMENT: NORMAL
FOOTNOTE: NORMAL
HAV IGM SER QL: NONREACTIVE
HBV CORE IGM SER QL: NONREACTIVE
HBV SURFACE AG SER QL: NONREACTIVE
HCV AB SER QL: NONREACTIVE
HCV S/CO RATIO: 0.12 S/CO
IL6 SERPL-MCNC: 14 PG/ML
LEPTIN SERPL-MCNC: 22 NG/ML
Lab: NORMAL
Lab: NORMAL
M TB IFN-G BLD-IMP: NEGATIVE
MMP-1 SERPL-MCNC: 19 NG/ML
MMP-3 SERPL-MCNC: 130 NG/ML
MYELOPEROXIDASE AB SER QL IA: <5 UNITS
MYELOPEROXIDASE CELLS FLD QL: NEGATIVE
PROTEINASE3 AB SER IA-ACNC: <5 UNITS
PROTEINASE3 AB SER-ACNC: NEGATIVE
QUANTIFERON TB PLUS MITOGEN MINUS NIL: 1.34 IU/ML
QUANTIFERON TB PLUS NIL: 0.02 IU/ML
QUANTIFERON TB PLUS TB1 MINUS NIL: 0 IU/ML
QUANTIFERON TB PLUS TB2 MINUS NIL: 0 IU/ML
RA DAS LEVEL QL VECTRADA: NORMAL
RESISTIN SERPL-MCNC: 6.8 NG/ML
RISK OF RADIOGRAPHIC PROGRESS: 6 %
TNFRSF1A SERPL-MCNC: 1.7 NG/ML
VAP-1 SERPL-MCNC: 1.1 UG/ML
VECTRA SCORE: 43
VEGF-A SERPL-MCNC: 390 PG/ML
YKL-40 RESULT: 130 NG/ML

## 2023-11-24 LAB
ANTI-BETA2 GLYCOPROTEIN 1 IGG CONCENTRATION: 2 U/ML
ANTI-BETA2 GLYCOPROTEIN 1 IGM CONCENTRATION: <2.4 U/ML
ANTI-CARDIOLIPIN IGG CONCENTRATION: 2 GPL
ANTI-CARDIOLIPIN IGM CONCENTRATION: 2 MPL
ANTI-CENP IGG CONCENTRATION: 1 U/ML
ANTI-CYCLIC CITRULLINATED PEPTIDE IGG CONCENTRATION: 2 U/ML
ANTI-DOUBLE-STRANDED DNA IGG CONCENTRATION: 55 IU/ML
ANTI-JO-1 IGG CONCENTRATION: 0 U/ML
ANTI-NUCLEAR ANTIBODIES - CYTOPLASMIC PATTERN: NORMAL
ANTI-NUCLEAR ANTIBODIES - PRIMARY NUCLEAR PATTERN: NORMAL
ANTI-NUCLEAR ANTIBODIES - PRIMARY PATTERN TITER: ABNORMAL
ANTI-NUCLEAR ANTIBODIES IGG CONCENTRATION: 87 UNITS
ANTI-RNA POL III IGG CONCENTRATION: <0.7 U/ML
ANTI-RNP70 IGG CONCENTRATION: 1 U/ML
ANTI-RO52 IGG CONCENTRATION: 9 U/ML
ANTI-RO60 IGG CONCENTRATION: 6 U/ML
ANTI-SCL-70 IGG CONCENTRATION: 1 U/ML
ANTI-SMITH IGG CONCENTRATION: <0.7 U/ML
ANTI-SS-B (LA) IGG CONCENTRATION: 1 U/ML
ANTI-THYROGLOBULIN IGG CONCENTRATION: 137 IU/ML
ANTI-THYROID PEROXIDASE IGG CONCENTRATION: 4 IU/ML
ANTI-U1RNP IGG CONCENTRATION: 7 U/ML
AVISE LUPUS INDEX: -1
AVISE LUPUS RESULT: NEGATIVE
B-LYMPHOCYTE-BOUND C4D (BC4D) LEVEL: 12
ERYTHROCYTE-BOUND C4D (EC4D) LEVEL: 2
RHEUMATOID FACTOR (IGA) CONCENTRATION: 3 IU/ML
RHEUMATOID FACTOR (IGM) CONCENTRATION: 1 IU/ML

## 2023-12-12 ENCOUNTER — APPOINTMENT (OUTPATIENT)
Dept: RHEUMATOLOGY | Facility: CLINIC | Age: 78
End: 2023-12-12
Payer: MEDICARE

## 2023-12-12 VITALS
OXYGEN SATURATION: 97 % | DIASTOLIC BLOOD PRESSURE: 93 MMHG | SYSTOLIC BLOOD PRESSURE: 180 MMHG | TEMPERATURE: 97.1 F | HEART RATE: 68 BPM

## 2023-12-12 VITALS — SYSTOLIC BLOOD PRESSURE: 160 MMHG | DIASTOLIC BLOOD PRESSURE: 87 MMHG | OXYGEN SATURATION: 95 % | HEART RATE: 63 BPM

## 2023-12-12 PROCEDURE — 36415 COLL VENOUS BLD VENIPUNCTURE: CPT

## 2023-12-12 PROCEDURE — 96374 THER/PROPH/DIAG INJ IV PUSH: CPT | Mod: 59

## 2023-12-12 PROCEDURE — 96365 THER/PROPH/DIAG IV INF INIT: CPT

## 2023-12-12 RX ORDER — GOLIMUMAB 50 MG/4ML
50 SOLUTION INTRAVENOUS
Qty: 0 | Refills: 0 | Status: COMPLETED
Start: 2023-11-10

## 2023-12-12 RX ORDER — METHYLPREDNISOLONE 40 MG/ML
40 INJECTION, POWDER, LYOPHILIZED, FOR SOLUTION INTRAMUSCULAR; INTRAVENOUS
Qty: 1 | Refills: 0 | Status: COMPLETED
Start: 2023-11-10

## 2023-12-12 RX ORDER — CETIRIZINE HYDROCHLORIDE 10 MG/1
10 TABLET, COATED ORAL
Qty: 0 | Refills: 0 | Status: COMPLETED
Start: 2023-11-10

## 2023-12-13 NOTE — REVIEW OF SYSTEMS
[FreeTextEntry3] : chronically uses eye drops; hx of chorioretinitis resolved w/ eye drops-  [FreeTextEntry9] : limited ROM in his throughout his back

## 2023-12-13 NOTE — ASSESSMENT
[FreeTextEntry1] : 77 y/o m w/ a hx of sarcoidosis, BPH, HTN, lumbar disc disease (L4-5 L sided radiculopathy).  ? morphea lower abd as child... He is presenting in office for sudden onset of a migratory asymmetrical large joint pauciarthritis over the past several months.   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  No peripheral joint inflammation on exam today.  Sclera bilaterally injected- chronically the history of chorioretinitis- reportedly not active at this time. No personal or FH psoriasis, AS, inflammatory back, bowel or eye dz but + FH SSc (severe) Positive response to steroids repeatedly over the last several months. Precise etiology remains unclear but exam is highly suggestive for ankylosing spondylitis or a seronegative spondylarthritis vs seronegative RA (given+ FH- brother, excellent response to TNFi).   NOTE: INITIAL PRESENTATION: June 2023 he experience sudden onset of severe pain and swelling initially in the right knee lasted five days,  then R electron Bursa again lasted several days and eventually the left shoulder for several days  and now severe pain and limited range of motion  lumbar spine...now for nearly 2 wks...not resolving without steroids.  again last few days and has not resolved   Discussion:  precise ideology remains elusive, so clearly inflammatory with elevated APR... Give an 80 mg of Depo-Medrol today but would avoid oral steroids given issues with hypertension and cardiovascular status.  In an effort to minimize exposure to steroids well start with low dose methotrexate 7.5 mg (not higher given daily EtOH intake), and order TNF inhibitor- Simponi Aria ideally.   Of note several antibodies are positive... Unclear if this is related  to current inflammatory state need to see an [place] which has been repeatedly negative in the past per patient history.  Will send studies for an advice CTD with the hep BC and QFT as well  2) + Sck70 1.3/ RNP + 7.8:  Needs repeat.  with [place]... Is this because of a positive family history of systemic scleroderma?  Gives a distant history of a possible morphia as a child on the abdomen with no recurrence.  Absolutely denies mucositis, keratoconjunctivitis/ oral dryness, raynauds, serositis (no cardiopulmonary, HSM), no renal dysfunction, rash, alopecia, cytopenias, bleeding or clotting dyscrasias, sclerodactyl/ or pruritus 10/5/2023 CHEST XR was nl    3) Sarcoidosis:   had an episode back in the mid 70s classic Madeline syndrome pattern...  - could not get out of bed w/ severe SOB and a persistent cough, l periarticular ankle swelling, EN, and lost about 45-50 lbs in the process of figuring out his dx... No treatment was given resolve spontaneously over the course of several months  4) HNP w/ lumbar radiculopathy - herniated L4-5 discs after 2 dalls over 10 years ago - took 3 years for him to get sensation back into his feet w/ a significant foot drop - everything is all back to normal, but does have some numbness in his big toes   5) HTN:  well controlled on current medication  6) Obesity:  BMI 38.. Is aware weights has been up and down over the years stable for the last several months. ? CAR ? GERD:  minimal complaints  AGE-APPROPRIATE SCREENING / MALIGNANCY - last PSA was recently and nl  - gets colonoscopy every 2 years due to family hx of colon cancer (father)......most recent colonoscopy was [facility]: annual flu vaccine 2023,  Prevnar 13 done, needs Prevnar 20 (confirmation needed) Pneumovax 23 needed (?2 doses??) Covid x4, no infections Shingrix Vaccine x 2 done need records FH:  father MM and Colon Cancer   Plan  - Administered 80 mg of DepoMedrol in R deltoid by RN  - complete labs to include QuantiFERON, AVISE  - starting on MTX 7.5 mg  qwk  - labs monthly - ideally with infusions  - starting on folic acid 1 mg.....knows to increase if experiencing any SE  - starting on Simponi aria....contacting insurance for approval  [Note: brother did well on Simponi aria] discussed the medication in detail   - f/u w/ pulm for repeat PFTs- and any history of CAR  - can you copy of the MRI the shoulder  - need vaccination records  -Is aware to call if there is any change in her underlying symptoms and to discuss results of a of AVISE panel  - RTO in 3 months

## 2023-12-13 NOTE — CONSULT LETTER
[FreeTextEntry2] : Tyler Jacobson MD [FreeTextEntry1] : Please see below for summary of  recent rheumatology evaluation and recommendations.  77 y/o m w/ a hx of sarcoidosis, BPH, HTN, lumbar disc disease (L4-5 L sided radiculopathy).  ? morphea lower abd as child... He is presenting in office for sudden onset of a migratory asymmetrical large joint pauciarthritis over the past several months.   SH:  Retired rheumatologist, , daily EtOH and infrequent cigar smoking  1) Inflammatory polyarthropathy- Seronegative RA:  HLAB27 neg, Labs significant for elevated inflammatory markers- ESR while on steroids (40 mg pred) 38/ CRP 7, and Vectra 43 with obvious pain and tenderness over L SI joint today and profound Limited range of motion throughout the spine with limited chest expansion and occiput to wall and a positive Schober's sign- with obvious pain and stiffness upon any motion and lasting throughout the day.  No peripheral joint inflammation on exam today.  Sclera bilaterally injected- chronically the history of chorioretinitis- reportedly not active at this time. No personal or FH psoriasis, AS, inflammatory back, bowel or eye dz but + FH SSc (severe) Positive response to steroids repeatedly over the last several months. Precise etiology remains unclear but exam is highly suggestive for ankylosing spondylitis or a seronegative spondylarthritis vs seronegative RA (given+ FH- brother, excellent response to TNFi).   NOTE: INITIAL PRESENTATION: June 2023 he experience sudden onset of severe pain and swelling initially in the right knee lasted five days,  then R electron Bursa again lasted several days and eventually the left shoulder for several days  and now severe pain and limited range of motion  lumbar spine...now for nearly 2 wks...not resolving without steroids.  again last few days and has not resolved   Discussion:  precise ideology remains elusive, so clearly inflammatory with elevated APR... Give an 80 mg of Depo-Medrol today but would avoid oral steroids given issues with hypertension and cardiovascular status.  In an effort to minimize exposure to steroids well start with low dose methotrexate 7.5 mg (not higher given daily EtOH intake), and order TNF inhibitor- Simponi Aria ideally.   Of note several antibodies are positive... Unclear if this is related  to current inflammatory state need to see an [place] which has been repeatedly negative in the past per patient history.  Will send studies for an advice CTD with the hep BC and QFT as well  2) + Sck70 1.3/ RNP + 7.8:  Needs repeat.  with [place]... Is this because of a positive family history of systemic scleroderma?  Gives a distant history of a possible morphia as a child on the abdomen with no recurrence.  Absolutely denies mucositis, keratoconjunctivitis/ oral dryness, raynauds, serositis (no cardiopulmonary, HSM), no renal dysfunction, rash, alopecia, cytopenias, bleeding or clotting dyscrasias, sclerodactyl/ or pruritus 10/5/2023 CHEST XR was nl    3) Sarcoidosis:   had an episode back in the mid 70s classic Madeline syndrome pattern...  - could not get out of bed w/ severe SOB and a persistent cough, l periarticular ankle swelling, EN, and lost about 45-50 lbs in the process of figuring out his dx... No treatment was given resolve spontaneously over the course of several months  4) HNP w/ lumbar radiculopathy - herniated L4-5 discs after 2 dalls over 10 years ago - took 3 years for him to get sensation back into his feet w/ a significant foot drop - everything is all back to normal, but does have some numbness in his big toes   5) HTN:  well controlled on current medication  6) Obesity:  BMI 38.. Is aware weights has been up and down over the years stable for the last several months. ? CAR ? GERD:  minimal complaints  AGE-APPROPRIATE SCREENING / MALIGNANCY - last PSA was recently and nl  - gets colonoscopy every 2 years due to family hx of colon cancer (father)......most recent colonoscopy was [facility] Mnt: annual flu vaccine 2023,  Prevnar 13 done, needs Prevnar 20 (confirmation needed) Pneumovax 23 needed (?2 doses??) Covid x4, no infections Shingrix Vaccine x 2 done need records FH:  father MM and Colon Cancer   Plan  - Administered 80 mg of DepoMedrol in R deltoid by RN  - complete labs to include QuantiFERON, AVISE  - starting on MTX 7.5 mg  qwk  - labs monthly - ideally with infusions  - starting on folic acid 1 mg.....knows to increase if experiencing any SE  - starting on Simponi aria....contacting insurance for approval  [Note: brother did well on Simponi aria] discussed the medication in detail   - f/u w/ pulm for repeat PFTs- and any history of CAR  - can you copy of the MRI the shoulder  - need vaccination records  -Is aware to call if there is any change in her underlying symptoms and to discuss results of a of AVISE panel  - RTO in 3 months  [FreeTextEntry3] : Neli Bhatti DNP, ANP-C Division or Rheumatology [facility]

## 2023-12-13 NOTE — HISTORY OF PRESENT ILLNESS
[FreeTextEntry1] : Initial HPI 11/9/2023  77 y/o m w/ a hx of sarcoidosis, BPH, HTN, lumbar disc disease (L4-5 L sided radiculopathy). He is presenting in office for sudden onset of a migratory asymmetrical large joint pauciarthritis over the past several months.  June 2023 he experience sudden onset of severe pain and swelling initially in the right knee it lasted five days the right electron again last several days and eventually the left shoulder severe pain and limited range of motion again last few days and has not resolved.   Most recent severe pain is located in his left SI joint associated with profound stiffness and limited range of motion throughout the spine but most severe in the lower back.  .. He denies pain and warmth, but it was significantly swollen and lasted for a week and residual  right knee issues lasted for 4 weeks and he had to go to PT.  Shoulder and John on both resolve spontaneously  Denies mucositis, keratoconjunctivitis/ oral dryness, raynauds, serositis (no cardiopulmonary, HSM), no renal dysfunction, rash, alopecia, cytopenias, bleeding or clotting dyscrasias, sclerodactyl/ or pruritus  10/5/2023 CHEST XR was nl   [Note: received flu and new covid vaccines....not interested in getting RSV vaccine at this time]  - stiffness lasts all day - reports 80 % improvement on prednisone.....last dose was two weeks ago  SARCOIDOSIS  - had an episode back in the mid 70s  - could not get out of bed w/ severe SOB and a persistent cough  - dx w/ chorioretinitis treated w/ eye drops....has not been active for years and still intermittently uses eye drops - lost about 45-50 lbs in the process of figuring out his dx  HERNIATED DISCS - herniated L4-5 discs after 2 slips over 10 years ago - took 3 years for him to get sensation back into his feet w/ a significant foot drop - everything is all back to normal, but does have some numbness in his big toes  - has limited ROM in his back w/ difficulty twisting   AGE-APPROPRIATE SCREENING / MALIGNANCY - last PSA was recently and nl  - gets colonoscopy every 2 years due to family hx of colon cancer (father)......most recent colonoscopy was nl    SURGICAL HX - R hernia   SOCIAL HX - drinks occasionally and smokes cigars  FAMILY  - Scleroderma (mother) - multiple myeloma (father) - colon cancer (father) - seronegative erosive rheumatoid arthritis (brother)

## 2023-12-13 NOTE — DATA REVIEWED
Brief Operative Note    Patient: Sven Clayton 66 year old male    MRN: [id number]    Surgeon(s): Win Greenberg MD  Phone Number: [phone]                       Surgeon(s) and Role:  Panel 1:     * Win Greenberg MD - Primary     * Mik Adams MD - Assisting  Panel 2:     * Godwin Liu MD - Primary    Assistant(s): SA Meme    Pre-Op Diagnosis: POLYCYSTIC KIDNEY DISEASE; SIGNIFICANT ABDOMINAL WALL DIASTASIS VENTRAL HERNIA     Post-Op Diagnosis: SAME (DEFECT ~35CM LONG)     Procedure: Procedure(s):  BILATERAL OPEN NEPHRECTOMY FOR POLYCYSTIC KIDNEY DISEASE, APPENDECTOMY  PRIMARY REPAIR OF INCISIONAL HERNIA WITH PREVENA VAC    Anesthesia Type: General                                   Complications: None    Description: SEE NOTE    Findings: SEE NOTE    Specimens Removed:   ID Type Source Tests Collected by Time   1 : left renal cyst  Cyst Fluid Abdomen FUNGAL CULTURE AND SMEAR, ANAEROBE/AEROBE, BACTERIAL CULTURE WITH GRAM STAIN Win Greenberg MD 4/24/2023 0939   A : appendix Tissue Appendix SURGICAL PATHOLOGY Win Greenberg MD 4/24/2023 1041   B : right kidney Tissue Kidney, Right SURGICAL PATHOLOGY Win Greenberg MD 4/24/2023 1103   C : left kidney  Tissue Kidney, Left SURGICAL PATHOLOGY Win Greenberg MD 4/24/2023 1107        Estimated Blood Loss: MIN (this portion)    Assistant Tasks: Opening and closing, Dissecting tissue and Removing tissue     Implants:   Implant Name Type Inv. Item Serial No.  Lot No. LRB No. Used Action   CLIP INTERNAL LG CHEVRON HRT LIGATE TRIANGULATE XSECT WIRE - S. Other Implant CLIP INTERNAL LG CHEVRON HRT LIGATE TRIANGULATE XSECT WIRE . [employer] [id number] N/A 1 Implanted   PATCH SURG 4X2IN SLNT EVARREST FIBRIN -  Other Implant PATCH SURG 4X2IN SLNT EVARREST FIBRIN [employer] [id number] N/A 1 Implanted   BARRIER ADH 6X5IN BIORESBL 1 SHT DEHP-FR SPRFLM SOD HALUR - [id number] Other Implant BARRIER ADH 6X5IN BIORESBL 1 SHT DEHP-FR SPRFLM SOD  HALUR  [employer] [id number] N/A 1 Implanted   BARRIER ADH 6X5IN BIORESBL 1 SHT DEHP-FR SPRFLM SOD HALUR - [id number] Other Implant BARRIER ADH 6X5IN BIORESBL 1 SHT DEHP-FR SPRFLM SOD HALUR  [employer] [id number] N/A 1 Implanted   BARRIER ADH 6X5IN BIORESBL 1 SHT DEHP-FR SPRFLM SOD HALUR - [id number] Other Implant BARRIER ADH 6X5IN BIORESBL 1 SHT DEHP-FR SPRFLM SOD HALUR  [employer] [id number] N/A 1 Implanted   BARRIER ADH 6X5IN BIORESBL 1 SHT DEHP-FR SPRFLM SOD HALUR - [id number] Other Implant BARRIER ADH 6X5IN BIORESBL 1 SHT DEHP-FR SPRFLM SOD HALUR  [employer] [id number] N/A 1 Implanted        [FreeTextEntry1] : Labs: 11/5/23 ESR 38 (on high dose steroids)/ CRP 7, Scl70 1.3, RNP 7.8, Vectra 43 - CBC normal within MCV of 102, CMP normal with a CO2 of 32, nl CK, ferritin, TSH, vitamin D 42, C3/C4, ACE Neg:  lyme/ tick borne panel, Hep B sAb + w/ neg Hep B sAg, Core Ab, HepC, RF/ CCP, ANGELA, dsDNA, SM, ANCA/ PR3/MPO  HLAB27 twice NEG years ago   Imaging: 10/30/23 MR Pelvis:  mild away of the hips and mild Si joints bilaterally 10/5/2023 CHEST XR was nl   6/26/23 MR of the right knee positive for a grade to sprain of the proximal medial collateral ligament with partial thickness tearing and periligamentous edema. Questionable oblique undersurface tear of the medial meniscus.  Mild DJD threw out with the trays baker cyst.  And mild distal quadriceps and insertional patellar tendoninosis

## 2023-12-14 LAB
ALBUMIN SERPL ELPH-MCNC: 4.6 G/DL
ALP BLD-CCNC: 62 U/L
ALT SERPL-CCNC: 21 U/L
ANION GAP SERPL CALC-SCNC: 13 MMOL/L
AST SERPL-CCNC: 21 U/L
BILIRUB SERPL-MCNC: 0.6 MG/DL
BUN SERPL-MCNC: 13 MG/DL
CALCIUM SERPL-MCNC: 9.5 MG/DL
CHLORIDE SERPL-SCNC: 102 MMOL/L
CO2 SERPL-SCNC: 30 MMOL/L
CREAT SERPL-MCNC: 0.97 MG/DL
EGFR: 81 ML/MIN/1.73M2
ERYTHROCYTE [SEDIMENTATION RATE] IN BLOOD BY WESTERGREN METHOD: 25 MM/HR
GLUCOSE SERPL-MCNC: 100 MG/DL
HCT VFR BLD CALC: 45.3 %
HGB BLD-MCNC: 14.8 G/DL
MCHC RBC-ENTMCNC: 32.7 GM/DL
MCHC RBC-ENTMCNC: 34.6 PG
MCV RBC AUTO: 105.8 FL
PLATELET # BLD AUTO: 223 K/UL
POTASSIUM SERPL-SCNC: 3.4 MMOL/L
PROT SERPL-MCNC: 6.5 G/DL
RBC # BLD: 4.28 M/UL
RBC # FLD: 14.5 %
SODIUM SERPL-SCNC: 145 MMOL/L
WBC # FLD AUTO: 6.3 K/UL

## 2023-12-16 DIAGNOSIS — U07.1 COVID-19: ICD-10-CM

## 2023-12-16 RX ORDER — NIRMATRELVIR AND RITONAVIR 300-100 MG
20 X 150 MG & KIT ORAL
Qty: 1 | Refills: 0 | Status: ACTIVE | COMMUNITY
Start: 2023-12-16 | End: 1900-01-01

## 2024-01-08 ENCOUNTER — APPOINTMENT (OUTPATIENT)
Dept: RHEUMATOLOGY | Facility: CLINIC | Age: 79
End: 2024-01-08
Payer: MEDICARE

## 2024-01-08 VITALS
RESPIRATION RATE: 18 BRPM | OXYGEN SATURATION: 96 % | SYSTOLIC BLOOD PRESSURE: 150 MMHG | HEART RATE: 59 BPM | DIASTOLIC BLOOD PRESSURE: 76 MMHG | TEMPERATURE: 96 F

## 2024-01-08 PROCEDURE — 36415 COLL VENOUS BLD VENIPUNCTURE: CPT

## 2024-01-08 PROCEDURE — 96374 THER/PROPH/DIAG INJ IV PUSH: CPT | Mod: 59

## 2024-01-08 PROCEDURE — 96365 THER/PROPH/DIAG IV INF INIT: CPT

## 2024-01-08 RX ORDER — METHYLPREDNISOLONE 40 MG/ML
40 INJECTION, POWDER, LYOPHILIZED, FOR SOLUTION INTRAMUSCULAR; INTRAVENOUS
Qty: 1 | Refills: 0 | Status: COMPLETED
Start: 2023-11-10

## 2024-01-08 RX ORDER — GOLIMUMAB 50 MG/4ML
50 SOLUTION INTRAVENOUS
Qty: 0 | Refills: 0 | Status: COMPLETED
Start: 2023-11-10

## 2024-01-08 RX ORDER — CETIRIZINE HYDROCHLORIDE 10 MG/1
10 TABLET, COATED ORAL
Qty: 0 | Refills: 0 | Status: COMPLETED
Start: 2023-11-10

## 2024-01-08 NOTE — HISTORY OF PRESENT ILLNESS
[2] : 2 [N/A] : N/A [Denies] : Denies [No] : No [Yes] : Yes [Declined] : Declined [de-identified] : lower back [Left upper extremity] : Left upper extremity [22g] : 22g [Start Time: ___] : Medication Start Time: [unfilled] [End Time: ___] : Medication End Time: [unfilled] [Medication Name: ___] : Medication Name: [unfilled] [Total Amount Administered: ___] : Total Amount Administered: [unfilled] [IV discontinued. Intact. No signs or symptoms of IV complications noted. Time: ___] : IV discontinued. Intact. No signs or symptoms of IV complications noted. Time: [unfilled] [Patient  instructed to seek medical attention with signs and symptoms of adverse effects] : Patient  instructed to seek medical attention with signs and symptoms of adverse effects [Patient left unit in no acute distress] : Patient left unit in no acute distress [Medications administered as ordered and tolerated well.] : Medications administered as ordered and tolerated well. [Blood drawn at time of visit] : Blood drawn at time of visit [de-identified] : 11:00am

## 2024-01-15 LAB
ALBUMIN SERPL ELPH-MCNC: 4.3 G/DL
ALP BLD-CCNC: 56 U/L
ALT SERPL-CCNC: 21 U/L
ANION GAP SERPL CALC-SCNC: 11 MMOL/L
AST SERPL-CCNC: 20 U/L
BILIRUB SERPL-MCNC: 0.5 MG/DL
BUN SERPL-MCNC: 19 MG/DL
CALCIUM SERPL-MCNC: 9.2 MG/DL
CHLORIDE SERPL-SCNC: 102 MMOL/L
CO2 SERPL-SCNC: 31 MMOL/L
CREAT SERPL-MCNC: 1.21 MG/DL
EGFR: 62 ML/MIN/1.73M2
ERYTHROCYTE [SEDIMENTATION RATE] IN BLOOD BY WESTERGREN METHOD: 19 MM/HR
GLUCOSE SERPL-MCNC: 110 MG/DL
HCT VFR BLD CALC: 42.9 %
HGB BLD-MCNC: 14.6 G/DL
MCHC RBC-ENTMCNC: 34 GM/DL
MCHC RBC-ENTMCNC: 36 PG
MCV RBC AUTO: 105.7 FL
PLATELET # BLD AUTO: 250 K/UL
POTASSIUM SERPL-SCNC: 3.7 MMOL/L
PROT SERPL-MCNC: 6.4 G/DL
RBC # BLD: 4.06 M/UL
RBC # FLD: 14 %
SODIUM SERPL-SCNC: 143 MMOL/L
WBC # FLD AUTO: 6.23 K/UL

## 2024-02-08 ENCOUNTER — RESULT REVIEW (OUTPATIENT)
Age: 79
End: 2024-02-08

## 2024-02-08 ENCOUNTER — APPOINTMENT (OUTPATIENT)
Dept: RHEUMATOLOGY | Facility: CLINIC | Age: 79
End: 2024-02-08
Payer: MEDICARE

## 2024-02-08 VITALS
HEIGHT: 63 IN | TEMPERATURE: 97.8 F | BODY MASS INDEX: 39.34 KG/M2 | HEART RATE: 61 BPM | OXYGEN SATURATION: 97 % | SYSTOLIC BLOOD PRESSURE: 130 MMHG | DIASTOLIC BLOOD PRESSURE: 60 MMHG | WEIGHT: 222 LBS

## 2024-02-08 DIAGNOSIS — M06.00 RHEUMATOID ARTHRITIS W/OUT RHEUMATOID FACTOR, UNSPECIFIED SITE: ICD-10-CM

## 2024-02-08 DIAGNOSIS — R76.8 OTHER SPECIFIED ABNORMAL IMMUNOLOGICAL FINDINGS IN SERUM: ICD-10-CM

## 2024-02-08 PROCEDURE — 99214 OFFICE O/P EST MOD 30 MIN: CPT

## 2024-02-08 NOTE — REVIEW OF SYSTEMS
[Red Eyes] : red eyes [Negative] : Heme/Lymph [As Noted in HPI] : as noted in HPI [FreeTextEntry3] : chronically uses eye drops; hx of chorioretinitis resolved w/ eye drops-  [FreeTextEntry9] : limited ROM in his throughout his back - much improved w/ no active joint complaints

## 2024-02-08 NOTE — DATA REVIEWED
[FreeTextEntry1] : Labs: 11/5/23 ESR 38 (on high dose steroids)/ CRP 7, Scl70 1.3, RNP 7.8, Vectra 43 - CBC normal within MCV of 102, CMP normal with a CO2 of 32, nl CK, ferritin, TSH, vitamin D 42, C3/C4, ACE Neg:  lyme/ tick borne panel, Hep B sAb + w/ neg Hep B sAg, Core Ab, HepC, RF/ CCP, ANGELA, dsDNA, SM, ANCA/ PR3/MPO  HLAB27 twice NEG years ago   Imaging: 10/30/23 MR Pelvis:  mild away of the hips and mild Si joints bilaterally 10/5/2023 CHEST XR was nl   6/26/23 MR of the right knee positive for a grade to sprain of the proximal medial collateral ligament with partial thickness tearing and periligamentous edema. Questionable oblique undersurface tear of the medial meniscus.  Mild DJD threw out with the trays baker cyst.  And mild distal quadriceps and insertional patellar tendoninosis

## 2024-02-08 NOTE — CONSULT LETTER
[Dear  ___] : Dear  [unfilled], [Consult Letter:] : I had the pleasure of evaluating your patient, [unfilled]. [Consult Closing:] : Thank you very much for allowing me to participate in the care of this patient.  If you have any questions, please do not hesitate to contact me. [Sincerely,] : Sincerely, [FreeTextEntry2] : Tyler Jacobson MD [FreeTextEntry1] : Please see below for summary of  recent rheumatology evaluation and recommendations.  79 y/o m w/ a hx of sarcoidosis, BPH, HTN, lumbar disc disease (L4-5 L sided radiculopathy).  ? morphea lower abd as child... He is presenting in office for sudden onset of a migratory asymmetrical large joint pauciarthritis over the past several months.   SH:  Retired rheumatologist, , daily EtOH and infrequent cigar smoking  1) Inflammatory polyarthropathy- Seronegative RA:  HLAB27 neg, Labs significant for elevated inflammatory markers- ESR while on steroids (40 mg pred) 38/ CRP 7, and Vectra 43 with obvious pain and tenderness over L SI joint today and profound Limited range of motion throughout the spine with limited chest expansion and occiput to wall and a positive Schober's sign- with obvious pain and stiffness upon any motion and lasting throughout the day.  No peripheral joint inflammation on exam today.  Sclera bilaterally injected- chronically the history of chorioretinitis- reportedly not active at this time. No personal or FH psoriasis, AS, inflammatory back, bowel or eye dz but + FH SSc (severe) Positive response to steroids repeatedly over the last several months. Precise etiology remains unclear but exam is highly suggestive for ankylosing spondylitis or a seronegative spondylarthritis vs seronegative RA (given+ FH- brother, excellent response to TNFi).   NOTE: INITIAL PRESENTATION: June 2023 he experience sudden onset of severe pain and swelling initially in the right knee lasted five days,  then R electron Bursa again lasted several days and eventually the left shoulder for several days  and now severe pain and limited range of motion  lumbar spine...now for nearly 2 wks...not resolving without steroids.  again last few days and has not resolved   Discussion:  precise ideology remains elusive, so clearly inflammatory with elevated APR... Give an 80 mg of Depo-Medrol today but would avoid oral steroids given issues with hypertension and cardiovascular status.  In an effort to minimize exposure to steroids well start with low dose methotrexate 7.5 mg (not higher given daily EtOH intake), and order TNF inhibitor- Simponi Aria ideally.   Of note several antibodies are positive... Unclear if this is related  to current inflammatory state need to see an ISRAEL which has been repeatedly negative in the past per patient history.  Will send studies for an advice CTD with the hep BC and QFT as well  2) + Sck70 1.3/ RNP + 7.8:  Needs repeat.  with ISRAEL... Is this because of a positive family history of systemic scleroderma?  Gives a distant history of a possible morphia as a child on the abdomen with no recurrence.  Absolutely denies mucositis, keratoconjunctivitis/ oral dryness, raynauds, serositis (no cardiopulmonary, HSM), no renal dysfunction, rash, alopecia, cytopenias, bleeding or clotting dyscrasias, sclerodactyl/ or pruritus 10/5/2023 CHEST XR was nl    3) Sarcoidosis:   had an episode back in the mid 70s classic Madeline syndrome pattern...  - could not get out of bed w/ severe SOB and a persistent cough, l periarticular ankle swelling, EN, and lost about 45-50 lbs in the process of figuring out his dx... No treatment was given resolve spontaneously over the course of several months  4) HNP w/ lumbar radiculopathy - herniated L4-5 discs after 2 dalls over 10 years ago - took 3 years for him to get sensation back into his feet w/ a significant foot drop - everything is all back to normal, but does have some numbness in his big toes   5) HTN:  well controlled on current medication  6) Obesity:  BMI 38.. Is aware weights has been up and down over the years stable for the last several months. ? CAR ? GERD:  minimal complaints  AGE-APPROPRIATE SCREENING / MALIGNANCY - last PSA was recently and nl  - gets colonoscopy every 2 years due to family hx of colon cancer (father)......most recent colonoscopy was [facility] Mnt: annual flu vaccine 2023,  Prevnar 13 done, needs Prevnar 20 (confirmation needed) Pneumovax 23 needed (?2 doses??) Covid x4, no infections Shingrix Vaccine x 2 done need records FH:  father MM and Colon Cancer   Plan  - Administered 80 mg of DepoMedrol in R deltoid by RN  - complete labs to include QuantiFERON, AVISE  - starting on MTX 7.5 mg  qwk  - labs monthly - ideally with infusions  - starting on folic acid 1 mg.....knows to increase if experiencing any SE  - starting on Simponi aria....contacting insurance for approval  [Note: brother did well on Simponi aria] discussed the medication in detail   - f/u w/ pulm for repeat PFTs- and any history of CAR  - can you copy of the MRI the shoulder  - need vaccination records  -Is aware to call if there is any change in her underlying symptoms and to discuss results of a of AVISE panel  - RTO in 3 months  [FreeTextEntry3] : Neli Bhatti DNP, ANP-[facility] Division or Rheumatology [facility]

## 2024-02-08 NOTE — HISTORY OF PRESENT ILLNESS
[FreeTextEntry1] : 2/8/2024  - doing well w/no active joint pain and minimal stiffness throughout entire back..  - doing well on MTX 4 pills and Simponi aria 2 doses w/ solumedrol 40 mg each infusion  - still no evidence of skin psoriasis; eyes conjunctiva continues to be red.. with no photophobia though.. slight improvement.. w/u possible scleral psoriasis.. pending eval next wk w/ opth  - started experiencing wheezing again sporadically....first instance was in 10/2023 that resolved w/ an inhaler no need for steroids - went for pulm eval w/ PFTs and a chest XR and it showed he had chronic bronchitis was going be placed on an antibiotic but was already on doxycycline for an ingrown toenail- acute paronychia .. completed 1 wk, pulm recommends continuing for total of 21 days..  - also confirms CAR to have formal study now..   - COVID infection 12/2023 and has full resolved with no sequelae  1) Seronegative SpA with axial involvement:  2) Obesity:  CAR  3) Lumbar radiculopathy  _______________________________________________________________________________   Initial HPI 11/9/2023  79 y/o m w/ a hx of sarcoidosis, BPH, HTN, lumbar disc disease (L4-5 L sided radiculopathy). He is presenting in office for sudden onset of a migratory asymmetrical large joint pauciarthritis over the past several months.  June 2023 he experience sudden onset of severe pain and swelling initially in the right knee it lasted five days the right electron again last several days and eventually the left shoulder severe pain and limited range of motion again last few days and has not resolved.   Most recent severe pain is located in his left SI joint associated with profound stiffness and limited range of motion throughout the spine but most severe in the lower back.  .. He denies pain and warmth, but it was significantly swollen and lasted for a week and residual  right knee issues lasted for 4 weeks and he had to go to PT.  Shoulder and John on both resolve spontaneously  Denies mucositis, keratoconjunctivitis/ oral dryness, raynauds, serositis (no cardiopulmonary, HSM), no renal dysfunction, rash, alopecia, cytopenias, bleeding or clotting dyscrasias, sclerodactyl/ or pruritus  10/5/2023 CHEST XR was nl   [Note: received flu and new covid vaccines....not interested in getting RSV vaccine at this time]  - stiffness lasts all day - reports 80 % improvement on prednisone.....last dose was two weeks ago  SARCOIDOSIS  - had an episode back in the mid 70s  - could not get out of bed w/ severe SOB and a persistent cough  - dx w/ chorioretinitis treated w/ eye drops....has not been active for years and still intermittently uses eye drops - lost about 45-50 lbs in the process of figuring out his dx  HERNIATED DISCS - herniated L4-5 discs after 2 slips over 10 years ago - took 3 years for him to get sensation back into his feet w/ a significant foot drop - everything is all back to normal, but does have some numbness in his big toes  - has limited ROM in his back w/ difficulty twisting   AGE-APPROPRIATE SCREENING / MALIGNANCY - last PSA was recently and nl  - gets colonoscopy every 2 years due to family hx of colon cancer (father)......most recent colonoscopy was nl    SURGICAL HX - R hernia   SOCIAL HX - drinks occasionally and smokes cigars  FAMILY  - Scleroderma (mother) - multiple myeloma (father) - colon cancer (father) - seronegative erosive rheumatoid arthritis (brother)

## 2024-02-08 NOTE — ASSESSMENT
[FreeTextEntry1] : 77 y/o m w/ a hx of sarcoidosis, BPH, HTN, lumbar disc disease (L4-5 L sided radiculopathy), BMI 39 w/ CAR  ? morphea lower abd as child... He is presenting in office for sudden onset of a migratory asymmetrical large joint pauciarthritis over the past several months.   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 Initial exam with marked limited ROM throughout spine as well.. with + Schober's and occiput to wall > 4"..with am stiffness lasting several hours, now fully resolved  Marked improvement now on 2 doses of Simponi Aria (12/23 started) and MTX 10 mg wkly.. only steroids with infusion and recently for acute bronchitis  high dose past few days ++ response.  -  Sclera bilaterally injected- chronically the history of chorioretinitis- reportedly not active at this time-> but notable improvement in degree of redness.  W/u for possible scleral psoriasis pending.. but absolutely not uveitis, or other obvious IED . No personal or FH psoriasis, AS, inflammatory back, bowel or eye dz but + FH SSc (severe) Positive response to steroids repeatedly over the last several months. Precise etiology remains unclear but exam is highly suggestive for ankylosing spondylitis or a seronegative spondylarthritis vs seronegative RA (given+ FH- brother, excellent response to TNFi).   NOTE: INITIAL PRESENTATION: June 2023 he experience sudden onset of severe pain and swelling initially in the right knee lasted five days,  then R electron Bursa again lasted several days and eventually the left shoulder for several days  and now severe pain and limited range of motion  lumbar spine...now for nearly 2 wks...not resolving without steroids.  again last few days and has not resolved   Discussion:  precise ideology remains elusive, so clearly inflammatory with elevated APR... Of note several antibodies are positive...but AVISE only + low titer ISRAEL, and TPO at 137   2) + Scl70 1.3/ RNP + 7.8: repeat on AVISE negative Needs repeat.  with ISRAEL... Is this because of a positive family history of systemic scleroderma?  Gives a distant history of a possible morphia as a child on the abdomen with no recurrence.  Absolutely denies mucositis, keratoconjunctivitis/ oral dryness, raynauds, serositis (no cardiopulmonary, HSM), no renal dysfunction, rash, alopecia, cytopenias, bleeding or clotting dyscrasias, sclerodactyl/ or pruritus 10/5/2023 CHEST XR was nl    3) Sarcoidosis:   had an episode back in the mid 70s classic Madeline syndrome pattern...  - could not get out of bed w/ severe SOB and a persistent cough, l periarticular ankle swelling, EN, and lost about 45-50 lbs in the process of figuring out his dx... No treatment was given resolve spontaneously over the course of several months  4) HNP w/ lumbar radiculopathy- not active  - herniated L4-5 discs after 2 dalls over 10 years ago - took 3 years for him to get sensation back into his feet w/ a significant foot drop - everything is all back to normal, but does have some numbness in his big toes   5) HTN:  well controlled on current medication  6) Obesity:  BMI 38.. Is aware weights has been up and down over the years stable for the last several months. ? CAR ? GERD:  minimal complaints  AGE-APPROPRIATE SCREENING / MALIGNANCY - last PSA was recently and nl  - gets colonoscopy every 2 years due to family hx of colon cancer (father)......most recent colon[facility] was [facility] Mnt: annual flu vaccine 2023,  Prevnar 13 done, needs Prevnar 20 (confirmation needed) Pneumovax 23 needed (?2 doses??) Covid x4, no infections Shingrix Vaccine x 2 done need records FH:  father MM and Colon Cancer   Plan - decrease MTX to 7.5 mg   - labs monthly - ideally with infusions  - continue folic acid 1 mg.....knows to increase if experiencing any SE  - continue simponi aria infusions w/ steroids 40 mg for now   - need vaccination records  - complete XRs of thoracic and lumbar spine   - f/u 3/5/2024 will see him during his infusion   -Is aware to call if there is any change in her underlying symptoms and to discuss results of a of AVISE panel

## 2024-02-08 NOTE — PHYSICAL EXAM
[General Appearance - Alert] : alert [General Appearance - In No Acute Distress] : in no acute distress [Sclera] : the sclera and conjunctiva were normal [PERRL With Normal Accommodation] : pupils were equal in size, round, and reactive to light [Extraocular Movements] : extraocular movements were intact [Outer Ear] : the ears and nose were normal in appearance [Oropharynx] : the oropharynx was normal [Neck Appearance] : the appearance of the neck was normal [Neck Cervical Mass (___cm)] : no neck mass was observed [Jugular Venous Distention Increased] : there was no jugular-venous distention [Thyroid Diffuse Enlargement] : the thyroid was not enlarged [Thyroid Nodule] : there were no palpable thyroid nodules [Respiration, Rhythm And Depth] : normal respiratory rhythm and effort [Auscultation Breath Sounds / Voice Sounds] : lungs were clear to auscultation bilaterally [Heart Rate And Rhythm] : heart rate was normal and rhythm regular [Heart Sounds] : normal S1 and S2 [Heart Sounds Gallop] : no gallops [Murmurs] : no murmurs [Heart Sounds Pericardial Friction Rub] : no pericardial rub [Edema] : there was no peripheral edema [No CVA Tenderness] : no ~M costovertebral angle tenderness [No Spinal Tenderness] : no spinal tenderness [Abnormal Walk] : normal gait [Nail Clubbing] : no clubbing  or cyanosis of the fingernails [Musculoskeletal - Swelling] : no joint swelling seen [Motor Tone] : muscle strength and tone were normal [Skin Color & Pigmentation] : normal skin color and pigmentation [Skin Turgor] : normal skin turgor [] : no rash [Sensation] : the sensory exam was normal to light touch and pinprick [No Focal Deficits] : no focal deficits [Oriented To Time, Place, And Person] : oriented to person, place, and time [Impaired Insight] : insight and judgment were intact [Affect] : the affect was normal [Cervical Lymph Nodes Enlarged Posterior Bilaterally] : posterior cervical [Cervical Lymph Nodes Enlarged Anterior Bilaterally] : anterior cervical [Supraclavicular Lymph Nodes Enlarged Bilaterally] : supraclavicular [FreeTextEntry1] : no rash

## 2024-02-09 ENCOUNTER — OFFICE (OUTPATIENT)
Dept: URBAN - METROPOLITAN AREA CLINIC 102 | Facility: CLINIC | Age: 79
Setting detail: OPHTHALMOLOGY
End: 2024-02-09
Payer: MEDICARE

## 2024-02-09 DIAGNOSIS — H40.033: ICD-10-CM

## 2024-02-09 DIAGNOSIS — H25.13: ICD-10-CM

## 2024-02-09 DIAGNOSIS — L40.59: ICD-10-CM

## 2024-02-09 DIAGNOSIS — H35.363: ICD-10-CM

## 2024-02-09 DIAGNOSIS — H40.023: ICD-10-CM

## 2024-02-09 DIAGNOSIS — H16.223: ICD-10-CM

## 2024-02-09 DIAGNOSIS — H43.811: ICD-10-CM

## 2024-02-09 PROCEDURE — 92133 CPTRZD OPH DX IMG PST SGM ON: CPT | Performed by: OPHTHALMOLOGY

## 2024-02-09 PROCEDURE — 92014 COMPRE OPH EXAM EST PT 1/>: CPT | Performed by: OPHTHALMOLOGY

## 2024-02-09 PROCEDURE — 92083 EXTENDED VISUAL FIELD XM: CPT | Performed by: OPHTHALMOLOGY

## 2024-02-09 ASSESSMENT — REFRACTION_MANIFEST
OD_AXIS: 80
OS_VA1: 20/25
OS_ADD: +2.50
OS_SPHERE: -0.50
OS_SPHERE: -0.75
OD_ADD: +2.50
OD_VA1: 20/25
OD_ADD: +2.50
OS_SPHERE: -0.50
OD_CYLINDER: -1.25
OS_AXIS: 120
OD_SPHERE: -1.00
OD_VA1: 20/20
OD_CYLINDER: -1.25
OD_CYLINDER: -1.50
OS_CYLINDER: -2.00
OD_SPHERE: -1.00
OS_VA1: 20/40
OD_SPHERE: -0.75
OS_CYLINDER: -1.75
OS_AXIS: 120
OS_AXIS: 109
OD_AXIS: 75
OD_AXIS: 80
OS_VA1: 20/25
OS_CYLINDER: -2.00
OS_ADD: +2.50

## 2024-02-09 ASSESSMENT — REFRACTION_CURRENTRX
OD_AXIS: 65
OS_SPHERE: -1.00
OD_CYLINDER: -1.25
OS_SPHERE: -0.50
OD_CYLINDER: -1.25
OS_AXIS: 110
OS_VPRISM_DIRECTION: PROGS
OD_AXIS: 80
OS_ADD: +2.50
OS_CYLINDER: -2.00
OS_CYLINDER: -1.00
OS_OVR_VA: 20/
OS_SPHERE: -0.50
OS_CYLINDER: -2.00
OD_SPHERE: -1.25
OS_VPRISM_DIRECTION: PROGS
OD_OVR_VA: 20/
OD_AXIS: 90
OS_AXIS: 124
OD_SPHERE: -1.00
OD_ADD: +2.50
OD_OVR_VA: 20/
OS_ADD: +2.50
OS_ADD: +2.50
OD_VPRISM_DIRECTION: PROGS
OD_VPRISM_DIRECTION: PROGS
OD_ADD: +2.50
OS_OVR_VA: 20/
OD_SPHERE: -0.75
OS_AXIS: 120
OD_ADD: +2.50
OS_OVR_VA: 20/
OD_OVR_VA: 20/
OD_CYLINDER: -1.25

## 2024-02-09 ASSESSMENT — SPHEQUIV_DERIVED
OS_SPHEQUIV: -1.375
OD_SPHEQUIV: -1.5
OS_SPHEQUIV: -1.75
OS_SPHEQUIV: -1.5
OD_SPHEQUIV: -1.625
OD_SPHEQUIV: -1.5
OS_SPHEQUIV: -1.5
OD_SPHEQUIV: -1.625

## 2024-02-09 ASSESSMENT — CONFRONTATIONAL VISUAL FIELD TEST (CVF)
OD_FINDINGS: FULL
OS_FINDINGS: FULL

## 2024-02-09 ASSESSMENT — REFRACTION_AUTOREFRACTION
OS_AXIS: 119
OD_SPHERE: -0.50
OS_SPHERE: -0.50
OD_CYLINDER: -2.00
OD_AXIS: 49
OS_CYLINDER: -2.00

## 2024-02-09 ASSESSMENT — TEAR BREAK UP TIME (TBUT)
OS_TBUT: 2+
OD_TBUT: 1+

## 2024-03-05 ENCOUNTER — APPOINTMENT (OUTPATIENT)
Dept: RHEUMATOLOGY | Facility: CLINIC | Age: 79
End: 2024-03-05
Payer: MEDICARE

## 2024-03-05 ENCOUNTER — MED ADMIN CHARGE (OUTPATIENT)
Age: 79
End: 2024-03-05

## 2024-03-05 VITALS
DIASTOLIC BLOOD PRESSURE: 77 MMHG | HEART RATE: 61 BPM | TEMPERATURE: 98 F | OXYGEN SATURATION: 98 % | SYSTOLIC BLOOD PRESSURE: 144 MMHG | RESPIRATION RATE: 17 BRPM

## 2024-03-05 PROCEDURE — 96365 THER/PROPH/DIAG IV INF INIT: CPT

## 2024-03-05 PROCEDURE — 36415 COLL VENOUS BLD VENIPUNCTURE: CPT

## 2024-03-05 PROCEDURE — 96374 THER/PROPH/DIAG INJ IV PUSH: CPT | Mod: 59

## 2024-03-05 RX ORDER — METHYLPREDNISOLONE 40 MG/ML
40 INJECTION, POWDER, LYOPHILIZED, FOR SOLUTION INTRAMUSCULAR; INTRAVENOUS
Qty: 1 | Refills: 0 | Status: COMPLETED
Start: 2023-11-10

## 2024-03-05 RX ORDER — GOLIMUMAB 50 MG/4ML
50 SOLUTION INTRAVENOUS
Qty: 0 | Refills: 0 | Status: COMPLETED
Start: 2023-11-10

## 2024-03-05 RX ORDER — CETIRIZINE HYDROCHLORIDE 10 MG/1
10 TABLET, COATED ORAL
Qty: 0 | Refills: 0 | Status: COMPLETED
Start: 2023-11-10

## 2024-03-05 NOTE — HISTORY OF PRESENT ILLNESS
[3] : 3 [N/A] : N/A [Denies] : Denies [Yes] : Yes [No] : No [TB] : Tuberculosis screening [Declined] : Declined [Hep acute panel] : Hepatitis acute panel [de-identified] : lower back pain [Left upper extremity] : Left upper extremity [24g] : 24g [Medication Name: ___] : Medication Name: [unfilled] [Start Time: ___] : Medication Start Time: [unfilled] [End Time: ___] : Medication End Time: [unfilled] [Total Amount Administered: ___] : Total Amount Administered: [unfilled] [IV discontinued. Intact. No signs or symptoms of IV complications noted. Time: ___] : IV discontinued. Intact. No signs or symptoms of IV complications noted. Time: [unfilled] [Patient  instructed to seek medical attention with signs and symptoms of adverse effects] : Patient  instructed to seek medical attention with signs and symptoms of adverse effects [Patient left unit in no acute distress] : Patient left unit in no acute distress [Medications administered as ordered and tolerated well.] : Medications administered as ordered and tolerated well. [Blood drawn at time of visit] : Blood drawn at time of visit [de-identified] : 10:10 am

## 2024-03-11 LAB
ALBUMIN SERPL ELPH-MCNC: 4.2 G/DL
ALP BLD-CCNC: 61 U/L
ALT SERPL-CCNC: 19 U/L
ANION GAP SERPL CALC-SCNC: 18 MMOL/L
AST SERPL-CCNC: 26 U/L
BILIRUB SERPL-MCNC: 0.4 MG/DL
BUN SERPL-MCNC: 15 MG/DL
CALCIUM SERPL-MCNC: 9.4 MG/DL
CHLORIDE SERPL-SCNC: 103 MMOL/L
CO2 SERPL-SCNC: 26 MMOL/L
CREAT SERPL-MCNC: 1.12 MG/DL
EGFR: 68 ML/MIN/1.73M2
ERYTHROCYTE [SEDIMENTATION RATE] IN BLOOD BY WESTERGREN METHOD: 45 MM/HR
GLUCOSE SERPL-MCNC: 84 MG/DL
HCT VFR BLD CALC: 43.3 %
HGB BLD-MCNC: 14.4 G/DL
MCHC RBC-ENTMCNC: 33.3 GM/DL
MCHC RBC-ENTMCNC: 34.1 PG
MCV RBC AUTO: 102.6 FL
PLATELET # BLD AUTO: 288 K/UL
POTASSIUM SERPL-SCNC: 4.2 MMOL/L
PROT SERPL-MCNC: 6.6 G/DL
RBC # BLD: 4.22 M/UL
RBC # FLD: 13 %
SODIUM SERPL-SCNC: 148 MMOL/L
WBC # FLD AUTO: 6.53 K/UL

## 2024-04-30 ENCOUNTER — APPOINTMENT (OUTPATIENT)
Dept: RHEUMATOLOGY | Facility: CLINIC | Age: 79
End: 2024-04-30
Payer: MEDICARE

## 2024-04-30 ENCOUNTER — MED ADMIN CHARGE (OUTPATIENT)
Age: 79
End: 2024-04-30

## 2024-04-30 VITALS
OXYGEN SATURATION: 95 % | HEART RATE: 77 BPM | DIASTOLIC BLOOD PRESSURE: 87 MMHG | TEMPERATURE: 97 F | RESPIRATION RATE: 18 BRPM | SYSTOLIC BLOOD PRESSURE: 170 MMHG

## 2024-04-30 VITALS
HEART RATE: 58 BPM | OXYGEN SATURATION: 97 % | RESPIRATION RATE: 17 BRPM | DIASTOLIC BLOOD PRESSURE: 76 MMHG | SYSTOLIC BLOOD PRESSURE: 163 MMHG

## 2024-04-30 PROCEDURE — 36415 COLL VENOUS BLD VENIPUNCTURE: CPT

## 2024-04-30 PROCEDURE — 96365 THER/PROPH/DIAG IV INF INIT: CPT

## 2024-04-30 PROCEDURE — 96374 THER/PROPH/DIAG INJ IV PUSH: CPT | Mod: 59

## 2024-04-30 RX ORDER — METHYLPREDNISOLONE 40 MG/ML
40 INJECTION, POWDER, LYOPHILIZED, FOR SOLUTION INTRAMUSCULAR; INTRAVENOUS
Qty: 1 | Refills: 0 | Status: COMPLETED
Start: 2023-11-10

## 2024-04-30 RX ORDER — GOLIMUMAB 50 MG/4ML
50 SOLUTION INTRAVENOUS
Qty: 0 | Refills: 0 | Status: COMPLETED
Start: 2023-11-10

## 2024-04-30 RX ORDER — CETIRIZINE HYDROCHLORIDE 10 MG/1
10 TABLET, COATED ORAL
Qty: 0 | Refills: 0 | Status: COMPLETED
Start: 2023-11-10

## 2024-04-30 NOTE — HISTORY OF PRESENT ILLNESS
[2] : 2 [N/A] : N/A [Denies] : Denies [No] : No [Yes] : Yes [TB] : Tuberculosis screening [Hep acute panel] : Hepatitis acute panel [de-identified] : BACK [Left upper extremity] : Left upper extremity [24g] : 24g [Start Time: ___] : Medication Start Time: [unfilled] [End Time: ___] : Medication End Time: [unfilled] [Medication Name: ___] : Medication Name: [unfilled] [Total Amount Administered: ___] : Total Amount Administered: [unfilled] [IV discontinued. Intact. No signs or symptoms of IV complications noted. Time: ___] : IV discontinued. Intact. No signs or symptoms of IV complications noted. Time: [unfilled] [Patient  instructed to seek medical attention with signs and symptoms of adverse effects] : Patient  instructed to seek medical attention with signs and symptoms of adverse effects [Patient left unit in no acute distress] : Patient left unit in no acute distress [Medications administered as ordered and tolerated well.] : Medications administered as ordered and tolerated well. [Blood drawn at time of visit] : Blood drawn at time of visit [de-identified] : 10:05 AM

## 2024-05-01 ENCOUNTER — RX RENEWAL (OUTPATIENT)
Age: 79
End: 2024-05-01

## 2024-05-01 RX ORDER — METHOTREXATE 2.5 MG/1
2.5 TABLET ORAL
Qty: 48 | Refills: 1 | Status: ACTIVE | COMMUNITY
Start: 2023-12-12 | End: 1900-01-01

## 2024-05-13 LAB
ALBUMIN SERPL ELPH-MCNC: 4.6 G/DL
ALP BLD-CCNC: 62 U/L
ALT SERPL-CCNC: 26 U/L
ANION GAP SERPL CALC-SCNC: 13 MMOL/L
AST SERPL-CCNC: 30 U/L
BILIRUB SERPL-MCNC: 0.8 MG/DL
BUN SERPL-MCNC: 15 MG/DL
CALCIUM SERPL-MCNC: 10 MG/DL
CHLORIDE SERPL-SCNC: 102 MMOL/L
CO2 SERPL-SCNC: 30 MMOL/L
CREAT SERPL-MCNC: 1.01 MG/DL
EGFR: 77 ML/MIN/1.73M2
ERYTHROCYTE [SEDIMENTATION RATE] IN BLOOD BY WESTERGREN METHOD: 13 MM/HR
GLUCOSE SERPL-MCNC: 103 MG/DL
HCT VFR BLD CALC: 43.4 %
HGB BLD-MCNC: 14.4 G/DL
MCHC RBC-ENTMCNC: 33.2 GM/DL
MCHC RBC-ENTMCNC: 34.4 PG
MCV RBC AUTO: 103.8 FL
PLATELET # BLD AUTO: 208 K/UL
POTASSIUM SERPL-SCNC: 3.6 MMOL/L
PROT SERPL-MCNC: 7 G/DL
RBC # BLD: 4.18 M/UL
RBC # FLD: 13.6 %
SODIUM SERPL-SCNC: 145 MMOL/L
WBC # FLD AUTO: 5.89 K/UL

## 2024-05-21 ENCOUNTER — APPOINTMENT (OUTPATIENT)
Dept: RHEUMATOLOGY | Facility: CLINIC | Age: 79
End: 2024-05-21
Payer: MEDICARE

## 2024-05-21 ENCOUNTER — OUTPATIENT (OUTPATIENT)
Dept: OUTPATIENT SERVICES | Facility: HOSPITAL | Age: 79
LOS: 1 days | End: 2024-05-21
Payer: MEDICARE

## 2024-05-21 ENCOUNTER — APPOINTMENT (OUTPATIENT)
Dept: RADIOLOGY | Facility: CLINIC | Age: 79
End: 2024-05-21
Payer: MEDICARE

## 2024-05-21 VITALS
SYSTOLIC BLOOD PRESSURE: 130 MMHG | TEMPERATURE: 96.3 F | HEIGHT: 63 IN | WEIGHT: 214 LBS | OXYGEN SATURATION: 97 % | DIASTOLIC BLOOD PRESSURE: 88 MMHG | HEART RATE: 67 BPM | BODY MASS INDEX: 37.92 KG/M2

## 2024-05-21 DIAGNOSIS — Z98.89 OTHER SPECIFIED POSTPROCEDURAL STATES: Chronic | ICD-10-CM

## 2024-05-21 DIAGNOSIS — M47.819 SPONDYLOSIS WITHOUT MYELOPATHY OR RADICULOPATHY, SITE UNSPECIFIED: ICD-10-CM

## 2024-05-21 PROCEDURE — 72040 X-RAY EXAM NECK SPINE 2-3 VW: CPT | Mod: 26

## 2024-05-21 PROCEDURE — 72040 X-RAY EXAM NECK SPINE 2-3 VW: CPT

## 2024-05-21 PROCEDURE — 72070 X-RAY EXAM THORAC SPINE 2VWS: CPT | Mod: 26

## 2024-05-21 PROCEDURE — 72070 X-RAY EXAM THORAC SPINE 2VWS: CPT

## 2024-05-21 PROCEDURE — 96372 THER/PROPH/DIAG INJ SC/IM: CPT

## 2024-05-21 PROCEDURE — 99214 OFFICE O/P EST MOD 30 MIN: CPT | Mod: 25

## 2024-05-21 RX ORDER — TRIAMCINOLONE ACETONIDE 40 MG/ML
40 SUSPENSION INTRA-ARTERIAL; INTRAMUSCULAR
Qty: 1 | Refills: 0 | Status: COMPLETED | OUTPATIENT
Start: 2024-05-21

## 2024-05-21 RX ORDER — METHOTREXATE 2.5 MG/1
2.5 TABLET ORAL
Qty: 48 | Refills: 1 | Status: DISCONTINUED | COMMUNITY
Start: 2023-11-10 | End: 2024-05-21

## 2024-05-21 RX ADMIN — TRIAMCINOLONE ACETONIDE 0 MG/ML: 40 INJECTION, SUSPENSION INTRA-ARTICULAR; INTRAMUSCULAR at 00:00

## 2024-05-21 NOTE — CONSULT LETTER
[Dear  ___] : Dear  [unfilled], [Consult Letter:] : I had the pleasure of evaluating your patient, [unfilled]. [Consult Closing:] : Thank you very much for allowing me to participate in the care of this patient.  If you have any questions, please do not hesitate to contact me. [Sincerely,] : Sincerely, [FreeTextEntry2] : Tyler Jacobson MD [FreeTextEntry1] : Please see below for summary of  recent rheumatology evaluation and recommendations.  79 y/o m w/ a hx of sarcoidosis, BPH, HTN, lumbar disc disease (L4-5 L sided radiculopathy).  ? morphea lower abd as child... He is presenting in office for sudden onset of a migratory asymmetrical large joint pauciarthritis over the past several months.   SH:  Retired rheumatologist, , daily EtOH and infrequent cigar smoking  1) Inflammatory polyarthropathy- Seronegative RA:  HLAB27 neg, Labs significant for elevated inflammatory markers- ESR while on steroids (40 mg pred) 38/ CRP 7, and Vectra 43 with obvious pain and tenderness over L SI joint today and profound Limited range of motion throughout the spine with limited chest expansion and occiput to wall and a positive Schober's sign- with obvious pain and stiffness upon any motion and lasting throughout the day.  No peripheral joint inflammation on exam today.  Sclera bilaterally injected- chronically the history of chorioretinitis- reportedly not active at this time. No personal or FH psoriasis, AS, inflammatory back, bowel or eye dz but + FH SSc (severe) Positive response to steroids repeatedly over the last several months. Precise etiology remains unclear but exam is highly suggestive for ankylosing spondylitis or a seronegative spondylarthritis vs seronegative RA (given+ FH- brother, excellent response to TNFi).   NOTE: INITIAL PRESENTATION: June 2023 he experience sudden onset of severe pain and swelling initially in the right knee lasted five days,  then R electron Bursa again lasted several days and eventually the left shoulder for several days  and now severe pain and limited range of motion  lumbar spine...now for nearly 2 wks...not resolving without steroids.  again last few days and has not resolved   Discussion:  precise ideology remains elusive, so clearly inflammatory with elevated APR... Give an 80 mg of Depo-Medrol today but would avoid oral steroids given issues with hypertension and cardiovascular status.  In an effort to minimize exposure to steroids well start with low dose methotrexate 7.5 mg (not higher given daily EtOH intake), and order TNF inhibitor- Simponi Aria ideally.   Of note several antibodies are positive... Unclear if this is related  to current inflammatory state need to see an IS[place]EL which has been repeatedly negative in the past per patient history.  Will send studies for an advice CTD with the hep BC and QFT as well  2) + Sck70 1.3/ RNP + 7.8:  Needs repeat.  with ISRAEL... Is this because of a positive family history of systemic scleroderma?  Gives a distant history of a possible morphia as a child on the abdomen with no recurrence.  Absolutely denies mucositis, keratoconjunctivitis/ oral dryness, raynauds, serositis (no cardiopulmonary, HSM), no renal dysfunction, rash, alopecia, cytopenias, bleeding or clotting dyscrasias, sclerodactyl/ or pruritus 10/5/2023 CHEST XR was nl    3) Sarcoidosis:   had an episode back in the mid 70s classic Madeline syndrome pattern...  - could not get out of bed w/ severe SOB and a persistent cough, l periarticular ankle swelling, EN, and lost about 45-50 lbs in the process of figuring out his dx... No treatment was given resolve spontaneously over the course of several months  4) HNP w/ lumbar radiculopathy - herniated L4-5 discs after 2 dalls over 10 years ago - took 3 years for him to get sensation back into his feet w/ a significant foot drop - everything is all back to normal, but does have some numbness in his big toes   5) HTN:  well controlled on current medication  6) Obesity:  BMI 38.. Is aware weights has been up and down over the years stable for the last several months. ? CAR ? GERD:  minimal complaints  AGE-APPROPRIATE SCREENING / MALIGNANCY - last PSA was recently and nl  - gets colonoscopy every 2 years due to family hx of colon cancer (father)......most recent colonoscopy was [facility]: annual flu vaccine 2023,  Prevnar 13 done, needs Prevnar 20 (confirmation needed) Pneumovax 23 needed (?2 doses??) Covid x4, no infections Shingrix Vaccine x 2 done need records FH:  father MM and Colon Cancer   Plan  - Administered 80 mg of DepoMedrol in R deltoid by RN  - complete labs to include QuantiFERON, AVISE  - starting on MTX 7.5 mg  qwk  - labs monthly - ideally with infusions  - starting on folic acid 1 mg.....knows to increase if experiencing any SE  - starting on Simponi aria....contacting insurance for approval  [Note: brother did well on Simponi aria] discussed the medication in detail   - f/u w/ pulm for repeat PFTs- and any history of CAR  - can you copy of the MRI the shoulder  - need vaccination records  -Is aware to call if there is any change in her underlying symptoms and to discuss results of a of AVISE panel  - RTO in 3 months  [FreeTextEntry3] : Neli Bhatti DNP, ANP-[facility] Division or Rheumatology [facility]

## 2024-05-21 NOTE — ASSESSMENT
[FreeTextEntry1] : 80 y/o m w/ a hx of sarcoidosis, BPH, HTN, lumbar disc disease (L4-5 L sided radiculopathy), BMI 39 w/ CAR  ? morphea lower abd as child... He is presenting in office for sudden onset of a migratory asymmetrical large joint pauciarthritis over the past several months.   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 no erosions, no evidence active inflammation at this point (MR 10/23- and moderate calcific tendinitis over R gluteus minimus) , Labs significant for elevated inflammatory markers- ESR while on steroids (40 mg pred) 38/ CRP 7, and Vectra 43 with obvious pain and tenderness over L SI joint and migratory large joint arthropathy in past few months (shoulders/ knees and hips)... Initial exam with marked limited ROM throughout spine as well.. with + Schober's and occiput to wall > 4"..with am stiffness lasting several hours, now fully resolved  Marked improvement now on 2 doses of Simponi Aria (12/23 started) and MTX 10 mg wkly.. only steroids with infusion and recently for acute bronchitis  high dose past few days ++ response.  -  Sclera bilaterally injected- chronically the history of chorioretinitis- reportedly not active at this time-> but notable improvement in degree of redness.  W/u for possible scleral psoriasis inconclusive- .. but absolutely not uveitis, or other obvious IED. Ongoing daily antihistamine for symptom management. No personal or FH psoriasis, AS, inflammatory back, bowel or eye dz but + FH SSc (severe) Positive response to steroids repeatedly. Precise etiology remains unclear but exam is highly suggestive for ankylosing spondylitis or a seronegative spondylarthritis vs seronegative RA (given+ FH- brother, excellent response to TNFi).   NOTE: INITIAL PRESENTATION: June 2023 he experience sudden onset of severe pain and swelling initially in the right knee lasted five days,  then R olectranon Bursa again lasted several days and eventually the left shoulder for several days  and now severe pain and limited range of motion  lumbar spine...now for nearly 2 wks...not resolving without steroids.  again last few days and has not resolved   Discussion:  precise ideology remains elusive, so clearly inflammatory with elevated APR... Of note several antibodies are positive...but AVISE only + low titer ISRAEL, and TPO at 137   2) + Scl70 1.3/ RNP + 7.8: repeat on AVISE NEG. Needs repeat.  with ISRAEL... Is this because of a positive family history of systemic scleroderma?  Gives a distant history of a possible morphia as a child on the abdomen with no recurrence.  Absolutely denies mucositis, keratoconjunctivitis/ oral dryness, raynauds, serositis (no cardiopulmonary, HSM), no renal dysfunction, rash, alopecia, cytopenias, bleeding or clotting dyscrasias, sclerodactyl/ or pruritus 10/5/2023 CHEST XR was nl    3) Sarcoidosis:   had an episode back in the mid 70s classic Madeline syndrome pattern...  - could not get out of bed w/ severe SOB and a persistent cough, l periarticular ankle swelling, EN, and lost about 45-50 lbs in the process of figuring out his dx... No treatment was given resolve spontaneously over the course of several months  4) HNP w/ lumbar radiculopathy- not active  - herniated L4-5 discs after 2 dalls over 10 years ago - took 3 years for him to get sensation back into his feet w/ a significant foot drop - everything is all back to normal, but does have some numbness in his big toes   5) HTN:  well controlled on current medication  6) Obesity:  BMI 38-> 37.. Is aware weights has been up and down over the years stable for the last several months. CAR: confirmed severe-> started on BiPAP with ++ response, feeling much better. ? GERD:  minimal complaints  AGE-APPROPRIATE SCREENING / MALIGNANCY - last PSA was recently and nl  - gets colonoscopy every 2 years due to family hx of colon cancer (father)......most recent colonoscopy was nl  [facility]: annual flu vaccine 2023,  Prevnar 13 done, needs Prevnar 20 (confirmation needed) Pneumovax 23 needed (?2 doses??) Covid x4, no infections Shingrix Vaccine x 2 done need records FH:  father MM and Colon Cancer   Plan - increase back to 10 mg MTX     - labs - ideally with infusions  - continue folic acid 1 mg.....knows to increase if experiencing any SE  - continue simponi aria infusions w/ steroids 40 mg for now - last infusion 4/30/24- given IM 40 mg kenalog today for return of L sided SI joint TTP  - need vaccination records: reportedly UTD on all..   - need to review films:  XRs of thoracic and lumbar spine  (need to review) and C spine..   - f/u every other infusion RPA   -Is aware to call if there is any change in her underlying symptoms

## 2024-05-21 NOTE — PROCEDURE
[Today's Date:] : Date: [unfilled] [Risks] : risks [Benefits] : benefits [Alternatives] : alternatives [Consent Obtained] : written consent was obtained prior to the procedure and is detailed in the patient's record [Patient] : Prior to the start of the procedure a time out was taken and the identity of the patient was confirmed via name and date of birth with the patient. The correct site and the procedure to be performed were confirmed. The correct side was confirmed if applicable. The availability of the correct equipment was verified [Therapeutic] : therapeutic [#1 Site: ______] : #1 site identified in the [unfilled] [Alcohol] : alcohol [25 gauge 5/8  inch] : A 25 gauge 5/8 inch needle was used [___ml Steriod Preparation] : [unfilled] ml of steriod preparation  [Tolerated Well] : the patient tolerated the procedure well [No Complications] : there were no complications [Instructions Given] : handouts/patient instructions were given to patient [Patient Instructed to Call] : patient was instructed to call if redness at site, a decrease in range of motion or an increase in pain is noted after procedure. [FreeTextEntry1] :   This is a strong steroid.. can cause anxiety, hunger, irritability, trouble sleeping, rarely causes palpitations or chest pain but if present go to ER and then let me know

## 2024-05-21 NOTE — HISTORY OF PRESENT ILLNESS
[FreeTextEntry1] : 05/21/24 updated labs.. overall improved with nl ESR/ CRP Still intermittent L SI joint TTT worse prior to infusion 4/30/24...well tolerated and absolutely feels better after the infusion w/ 40 mg solumedrol.. ..  - still no rash,  or IBD sx  - eyes still irritated with routine need for gtt but not inflammatory- severe dryness.  - lowered MTX to 7.5 mg.. and the stiffness/ local SI pain worse  - still no obvious rash...   1) Seronegative SpA   2) BMI 37 CAR:  moderate- severe   -------------- 2/8/2024  - doing well w/no active joint pain and minimal stiffness throughout entire back..  - doing well on MTX 4 pills and Simponi aria 2 doses w/ solumedrol 40 mg each infusion  - still no evidence of skin psoriasis; eyes conjunctiva continues to be red.. with no photophobia though.. slight improvement.. w/u possible scleral psoriasis.. pending eval next wk w/ opth- can not conclusively say this is psoraisis.. absolutely NO IED and routinely using anti histamine gtts.. with some but incomplete response.  - no recent wheezing, cp, sob..  - recently dx with severe CAR- started on BiPAP with ++ response, absolutely noted improved pulm function with much improved 6MWT  - no acute lumbar radiculopathy  .  - COVID infection 12/2023 and has full resolved with no sequelae  1) Seronegative SpA with axial involvement:  2) Obesity:  CAR  3) Lumbar radiculopathy _______________________________________________________________________________   Initial HPI 11/9/2023  79 y/o m w/ a hx of sarcoidosis, BPH, HTN, lumbar disc disease (L4-5 L sided radiculopathy). He is presenting in office for sudden onset of a migratory asymmetrical large joint pauciarthritis over the past several months.  June 2023 he experience sudden onset of severe pain and swelling initially in the right knee it lasted five days the right electron again last several days and eventually the left shoulder severe pain and limited range of motion again last few days and has not resolved.   Most recent severe pain is located in his left SI joint associated with profound stiffness and limited range of motion throughout the spine but most severe in the lower back.  .. He denies pain and warmth, but it was significantly swollen and lasted for a week and residual  right knee issues lasted for 4 weeks and he had to go to PT.  Shoulder and John on both resolve spontaneously  Denies mucositis, keratoconjunctivitis/ oral dryness, raynauds, serositis (no cardiopulmonary, HSM), no renal dysfunction, rash, alopecia, cytopenias, bleeding or clotting dyscrasias, sclerodactyl/ or pruritus  10/5/2023 CHEST XR was nl   [Note: received flu and new covid vaccines....not interested in getting RSV vaccine at this time]  - stiffness lasts all day - reports 80 % improvement on prednisone.....last dose was two weeks ago  SARCOIDOSIS  - had an episode back in the mid 70s  - could not get out of bed w/ severe SOB and a persistent cough  - dx w/ chorioretinitis treated w/ eye drops....has not been active for years and still intermittently uses eye drops - lost about 45-50 lbs in the process of figuring out his dx  HERNIATED DISCS - herniated L4-5 discs after 2 slips over 10 years ago - took 3 years for him to get sensation back into his feet w/ a significant foot drop - everything is all back to normal, but does have some numbness in his big toes  - has limited ROM in his back w/ difficulty twisting   AGE-APPROPRIATE SCREENING / MALIGNANCY - last PSA was recently and nl  - gets colonoscopy every 2 years due to family hx of colon cancer (father)......most recent colonoscopy was nl    SURGICAL HX - R hernia   SOCIAL HX - drinks occasionally and smokes cigars  FAMILY  - Scleroderma (mother) - multiple myeloma (father) - colon cancer (father) - seronegative erosive rheumatoid arthritis (brother)

## 2024-05-21 NOTE — REVIEW OF SYSTEMS
[Red Eyes] : red eyes [Dry Eyes] : dryness of the eyes [Negative] : Heme/Lymph [As Noted in HPI] : as noted in HPI [Wheezing] : wheezing [FreeTextEntry2] : overall feeling better  [FreeTextEntry7] : no evidence of IBD  [de-identified] : no obvious rash  [FreeTextEntry3] : chronically uses eye drop- anti histamines s; hx of chorioretinitis resolved w/ eye drops- NO IED  [FreeTextEntry5] : known systolic murmur- long standing  [FreeTextEntry6] : Dx CAR- severe - resolution of acute bronchitis (following COVID infection) [FreeTextEntry9] : limited ROM in his throughout his back - much improved w/ no active joint complaints

## 2024-05-21 NOTE — PHYSICAL EXAM
[General Appearance - Alert] : alert [General Appearance - In No Acute Distress] : in no acute distress [Sclera] : the sclera and conjunctiva were normal [PERRL With Normal Accommodation] : pupils were equal in size, round, and reactive to light [Extraocular Movements] : extraocular movements were intact [Outer Ear] : the ears and nose were normal in appearance [Oropharynx] : the oropharynx was normal [Neck Appearance] : the appearance of the neck was normal [Neck Cervical Mass (___cm)] : no neck mass was observed [Jugular Venous Distention Increased] : there was no jugular-venous distention [Thyroid Diffuse Enlargement] : the thyroid was not enlarged [Thyroid Nodule] : there were no palpable thyroid nodules [Respiration, Rhythm And Depth] : normal respiratory rhythm and effort [Auscultation Breath Sounds / Voice Sounds] : lungs were clear to auscultation bilaterally [Heart Rate And Rhythm] : heart rate was normal and rhythm regular [Heart Sounds] : normal S1 and S2 [Heart Sounds Gallop] : no gallops [Murmurs] : no murmurs [Heart Sounds Pericardial Friction Rub] : no pericardial rub [Edema] : there was no peripheral edema [Cervical Lymph Nodes Enlarged Posterior Bilaterally] : posterior cervical [Cervical Lymph Nodes Enlarged Anterior Bilaterally] : anterior cervical [Supraclavicular Lymph Nodes Enlarged Bilaterally] : supraclavicular [No CVA Tenderness] : no ~M costovertebral angle tenderness [No Spinal Tenderness] : no spinal tenderness [Skin Color & Pigmentation] : normal skin color and pigmentation [] : no rash [Skin Turgor] : normal skin turgor [Sensation] : the sensory exam was normal to light touch and pinprick [No Focal Deficits] : no focal deficits [Oriented To Time, Place, And Person] : oriented to person, place, and time [Impaired Insight] : insight and judgment were intact [Affect] : the affect was normal [Abnormal Walk] : normal gait [Nail Clubbing] : no clubbing  or cyanosis of the fingernails [Musculoskeletal - Swelling] : no joint swelling seen [Motor Tone] : muscle strength and tone were normal [Abdomen Soft] : soft [Abdomen Tenderness] : non-tender [FreeTextEntry1] : slight creps in bl knees w/ fROM; bl hips lacking 20-30-> 10-20 now  improved..  degrees L>R w/ NO POM on L-no dactylitis, or obvious joint effusion, minimal OA changes.

## 2024-06-18 ENCOUNTER — EMERGENCY (EMERGENCY)
Facility: HOSPITAL | Age: 79
LOS: 0 days | Discharge: HOME | End: 2024-06-18
Attending: EMERGENCY MEDICINE
Payer: MEDICARE

## 2024-06-18 VITALS
SYSTOLIC BLOOD PRESSURE: 140 MMHG | RESPIRATION RATE: 18 BRPM | HEART RATE: 65 BPM | DIASTOLIC BLOOD PRESSURE: 68 MMHG | OXYGEN SATURATION: 95 % | TEMPERATURE: 98 F

## 2024-06-18 VITALS
OXYGEN SATURATION: 96 % | HEART RATE: 70 BPM | TEMPERATURE: 98 F | RESPIRATION RATE: 19 BRPM | WEIGHT: 216.27 LBS | SYSTOLIC BLOOD PRESSURE: 165 MMHG | DIASTOLIC BLOOD PRESSURE: 92 MMHG

## 2024-06-18 DIAGNOSIS — N40.0 BENIGN PROSTATIC HYPERPLASIA WITHOUT LOWER URINARY TRACT SYMPTOMS: ICD-10-CM

## 2024-06-18 DIAGNOSIS — I10 ESSENTIAL (PRIMARY) HYPERTENSION: ICD-10-CM

## 2024-06-18 DIAGNOSIS — R33.9 RETENTION OF URINE, UNSPECIFIED: ICD-10-CM

## 2024-06-18 DIAGNOSIS — N45.1 EPIDIDYMITIS: ICD-10-CM

## 2024-06-18 DIAGNOSIS — I25.10 ATHEROSCLEROTIC HEART DISEASE OF NATIVE CORONARY ARTERY WITHOUT ANGINA PECTORIS: ICD-10-CM

## 2024-06-18 DIAGNOSIS — Z98.89 OTHER SPECIFIED POSTPROCEDURAL STATES: Chronic | ICD-10-CM

## 2024-06-18 DIAGNOSIS — Z88.2 ALLERGY STATUS TO SULFONAMIDES: ICD-10-CM

## 2024-06-18 DIAGNOSIS — Z88.0 ALLERGY STATUS TO PENICILLIN: ICD-10-CM

## 2024-06-18 LAB
APPEARANCE UR: CLEAR — SIGNIFICANT CHANGE UP
BACTERIA # UR AUTO: NEGATIVE /HPF — SIGNIFICANT CHANGE UP
BILIRUB UR-MCNC: NEGATIVE — SIGNIFICANT CHANGE UP
CAST: 0 /LPF — SIGNIFICANT CHANGE UP (ref 0–4)
COLOR SPEC: ABNORMAL
DIFF PNL FLD: ABNORMAL
GLUCOSE UR QL: NEGATIVE MG/DL — SIGNIFICANT CHANGE UP
KETONES UR-MCNC: NEGATIVE MG/DL — SIGNIFICANT CHANGE UP
LEUKOCYTE ESTERASE UR-ACNC: ABNORMAL
NITRITE UR-MCNC: POSITIVE
PH UR: 5 — SIGNIFICANT CHANGE UP (ref 5–8)
PROT UR-MCNC: NEGATIVE MG/DL — SIGNIFICANT CHANGE UP
RBC CASTS # UR COMP ASSIST: 15 /HPF — HIGH (ref 0–4)
SP GR SPEC: 1.01 — SIGNIFICANT CHANGE UP (ref 1–1.03)
SQUAMOUS # UR AUTO: 0 /HPF — SIGNIFICANT CHANGE UP (ref 0–5)
UROBILINOGEN FLD QL: 1 MG/DL — SIGNIFICANT CHANGE UP (ref 0.2–1)
WBC UR QL: 2 /HPF — SIGNIFICANT CHANGE UP (ref 0–5)

## 2024-06-18 PROCEDURE — 93975 VASCULAR STUDY: CPT

## 2024-06-18 PROCEDURE — 99285 EMERGENCY DEPT VISIT HI MDM: CPT | Mod: 25

## 2024-06-18 PROCEDURE — 81001 URINALYSIS AUTO W/SCOPE: CPT

## 2024-06-18 PROCEDURE — 99284 EMERGENCY DEPT VISIT MOD MDM: CPT

## 2024-06-18 PROCEDURE — 76870 US EXAM SCROTUM: CPT

## 2024-06-18 PROCEDURE — 76870 US EXAM SCROTUM: CPT | Mod: 26

## 2024-06-18 PROCEDURE — 93975 VASCULAR STUDY: CPT | Mod: 26

## 2024-06-18 RX ORDER — CEFUROXIME AXETIL 250 MG
500 TABLET ORAL ONCE
Refills: 0 | Status: COMPLETED | OUTPATIENT
Start: 2024-06-18 | End: 2024-06-18

## 2024-06-18 RX ORDER — CEFUROXIME AXETIL 250 MG
1 TABLET ORAL
Qty: 20 | Refills: 0
Start: 2024-06-18 | End: 2024-06-27

## 2024-06-18 RX ADMIN — Medication 500 MILLIGRAM(S): at 05:56

## 2024-06-18 NOTE — ED PROVIDER NOTE - NSFOLLOWUPINSTRUCTIONS_ED_ALL_ED_FT
Urinary Retention    Urinary retention is the inability to completely empty your bladder. This is a common problem in older men, especially with enlarged prostates. If you are sent home with a cook catheter and a drainage system make sure to keep the drainage bag emptied and lower than your catheter. Keep the cook catheter in until you follow up with a urologist.    SEEK IMMEDIATE MEDICAL CARE IF YOU DEVELOP THE FOLLOWING SYMPTOMS: the catheter stops draining urine, the catheter falls out, abdominal pain, nausea/vomiting, or chills/fever.

## 2024-06-18 NOTE — ED PROVIDER NOTE - CARE PROVIDER_API CALL
Morgan Tena  Urology  [address] [place] [address]  Phone: [phone]  Fax: [phone]  Follow Up Time: 1-3 Days

## 2024-06-18 NOTE — ED ADULT NURSE NOTE - NS PRO PASSIVE SMOKE EXP
Occupational Therapy:    Toilet Transfers: Randolph competes toilet transfers independently with the use of a rolling walker and toilet safety rails. Recommend supervision initially.     Shower/Tub Transfers: Randolph requires supervision while completing a shower transfer. While standing with his rolling walker, Randolph steps backwards over the shower ledge then transitions to sitting on the shower chair. Randolph then stands from the shower chair and steps forward over the shower ledge to exit the shower while holding onto his rolling walker. Recommend completing dry run prior to completing full wet shower.     Upper Body Dressing: Randolph requires total assist for cervical collar management while lying supine in bed. Randolph is able to independently put on and take off his shirts while seated.    Lower Body Dressing: Randolph requires supervision while completing lower body dressing tasks for intermittent verbal cues to adhere to spinal precautions. Randolph requires intermittent verbal cues for proper use of his long handled shoe horn, sock aid, and dressing pal.     Bathing: Randolph requires assist to apply his [place] collar prior to completing bathing tasks. Randolph requires supervision to complete bathing tasks for intermittent verbal cues to adhere to spinal precautions.     Toileting: Randolph is modified independent for toileting tasks with the use of a rolling walker, toilet safety rails, and a urinal.     Grooming: Randolph requires total assist to shave while lying supine in bed in order to adhere to spinal precautions. Randolph is modified independent with grooming tasks besides shaving.     Household Mobility/Household Activity: Randolph can independently ambulate with this rolling walker up to 100 feet within the house. Randolph requires assistance with household activities, including, cooking and cleaning.     Driving: Driving is unsafe and not recommended at this time. Consult your physician for approval before resuming driving.  "Temporary Handicap Placard provided.    Upper Extremity Management: Complete upper extremity home exercise program 5-7 days a week as tolerated.       Entered by: RYANNE Colmenares, OTR/L on: 02/06/2024  [email or web address]      Physical Therapy:      Bed mobility: Randolph can get in and out of bed independently with use of bed rail, but pending fatigue may require minimum assistance for bringing his right leg up onto the bed when lying down. When sitting up, he may need minimum assistance to fully sit up. Recommend use of bed rail to improve independence.      Transfers: Randolph is modified independent for transfers using the rolling walker.     Ambulation: Randolph can walk up to 100 feet with rolling walker at modified independent level. Any distances greater than 100 feet should be supervised.     Elevations: Randolph requires supervision when completing elevations using both handrails. Go UP with the GOOD leg (left), and DOWN with the BAD leg (right).        Entered by: Sue Rome PT, DPT on: 2/6/24       Additional:     Weight-Bearing Status: Randolph is weight bearing as tolerated on both lower extremities.      Precautions: Spinal precautions - no bending, lifting >5lbs, or twisting neck.      Braces/Prosthesis: Randolph is to wear his [place] cervical collar at all times except for showering and should be applied while lying supine in bed. Randolph is to wear his [place] cervical collar while showering and should be applied and changed while lying supine in bed.      Durable Medical Equipment: Patient was issued a rolling walker from [employer].  Please call [phone] with any issues or questions.     Home Exercise Program: Complete lower body exercise program daily. Complete upper extremity exercises daily.         SPEECH THERAPY:     Communication:  You communicate the best when you use the following strategies: speaking more slowly, emphasizing your sounds (\"speak clearly\"), speaking louder, and " speaking at a slower pace. Continue to use your incentive spirometer to improve breath support.     Cognition:  You are having difficulty with solving problems, organization, and sequencing.  Supervision and assistance is recommended for the following tasks: managing your medications and managing your finances. Continue to use your strategies to help your thinking: STAR - Stop, Think, Act, and Review.    Home Exercise Program: A home exercise program was issued. Complete any task or activity that keeps your brain active! Here are some examples: write out a schedule of daily activities; write a list of how to spend leisure time such as bowling, going to the movies, have a party, then plan the activity; watch the news on TV and discuss it with a family member or friend; do word puzzles such as crosswords, word searches; table games such as Scrabble, checkers/chess, bingo, Yahtzee, or Boggle; plan a meal and write a list of the items needed to prepare the meal; spend time outside; read a newspaper or magazine article and discuss it with a friend; money management, making change, writing checks, balancing the checkbook, make a budget; read and order from a menu.    Entered by Laly Ch CCC-SLP on 2/6/2024     No

## 2024-06-18 NOTE — ED ADULT NURSE NOTE - OBJECTIVE STATEMENT
pt c/o urinary retention beginning today. pt endorses that he has not voided x4hrs. pt endorses suprapubic pain and fullness. pt has pmh of  bph that has been controlled with medicine. pt a&ox4, ambulatory, speaking in full and complete sentences without difficulty. denies n/v/d/fever/chills/cp/sob

## 2024-06-18 NOTE — ED PROVIDER NOTE - OBJECTIVE STATEMENT
79-year-old male with history of BPH, CAD, hypertension, sarcoidosis presents for evaluation of acute urinary retention with inability to urinate for greater than 4 hours.  Patient also notes that he had trauma to his scrotum and testicles couple days ago when the handle of a golf club fell down and hit him in his scrotum.  Patient notes that he had initial swelling but this is seem to improve slightly.  Patient denies any gross hematuria.  Patient is currently afebrile.  Patient denies any flank pain.  Patient notes progressively worsening discomfort and suprapubic distention.  Patient has been taking Cipro twice daily for the last couple of days.

## 2024-06-18 NOTE — ED PROVIDER NOTE - NSICDXPASTMEDICALHX_GEN_ALL_CORE_FT
PAST MEDICAL HISTORY:  BPH (benign prostatic hyperplasia)     CAD (coronary artery disease)     Herniated lumbar intervertebral disc     Hypertension     Mitral valve regurgitation     Osteoarthrosis     Sarcoidosis     Sepsis unrosepsis

## 2024-06-18 NOTE — ED PROVIDER NOTE - NSICDXFAMILYHX_GEN_ALL_CORE_FT
FAMILY HISTORY:  Father  Still living? No  Family history of hypertension in sister, Age at diagnosis: Age Unknown  Family history of multiple myeloma, Age at diagnosis: Age Unknown    Mother  Still living? No  Family history of hypertension in sister, Age at diagnosis: Age Unknown  Family history of scleroderma, Age at diagnosis: Age Unknown    Sibling  Still living? Unknown  Family history of hypertension in sister, Age at diagnosis: Age Unknown

## 2024-06-18 NOTE — ED ADULT NURSE NOTE - NSFALLUNIVINTERV_ED_ALL_ED
Bed/Stretcher in lowest position, wheels locked, appropriate side rails in place/Call bell, personal items and telephone in reach/Instruct patient to call for assistance before getting out of bed/chair/stretcher/Non-slip footwear applied when patient is off stretcher/[place] to call system/Physically safe environment - no spills, clutter or unnecessary equipment/Purposeful proactive rounding/Room/bathroom lighting operational, light cord in reach

## 2024-06-18 NOTE — ED ADULT TRIAGE NOTE - CHIEF COMPLAINT QUOTE
pt ambulatory to ed c/o urinary retention. pt last urinated 4 hours pta. pt endorsing suprapubic and penile pain. took pyridium with no relief. +allergy to sulfur and amoxicillin.

## 2024-06-18 NOTE — ED ADULT NURSE REASSESSMENT NOTE - NS ED NURSE REASSESS COMMENT FT1
pt unable to tolerate bladder scan. 14fr cook catheter inserted. immediate output of 900ml clear orange urine, no blood, no sediments noted. pt tolerated procedure well. cook in place, patent, and draining. UA C&S collected and sent to laboratory.

## 2024-06-18 NOTE — ED PROVIDER NOTE - CLINICAL SUMMARY MEDICAL DECISION MAKING FREE TEXT BOX
79-year-old male with history of BPH, CAD, hypertension, sarcoidosis presents for evaluation of acute urinary retention with inability to urinate for greater than 4 hours.  Patient also notes that he had trauma to his scrotum and testicles couple days ago when the handle of a golf club fell down and hit him in his scrotum.  Patient notes that he had initial swelling but this is seem to improve slightly.  Patient denies any gross hematuria.  Patient is currently afebrile.  Patient denies any flank pain.  Patient notes progressively worsening discomfort and suprapubic distention.  Patient has been taking Cipro twice daily for the last couple of days.   Renner catheter to be placed by nursing staff will send urinalysis with reflex to culture, testicular ultrasound and reassess.  Patient to follow-up with his urologist Dr. Tena in 1-2 days

## 2024-06-18 NOTE — ED PROVIDER NOTE - PHYSICAL EXAMINATION
CONSTITUTIONAL: Well appearing, awake, alert, oriented to person, place, time/situation and in no apparent distress But uncomfortable and pacing in the room  · ENMT: Airway patent, Nasal mucosa clear. Mouth with normal mucosa. Throat has no vesicles, no oropharyngeal exudates and uvula is midline.  · EYES: Clear bilaterally, pupils equal, round and reactive to light.  · CARDIAC: Normal rate, regular rhythm.  Heart sounds S1, S2.  No murmurs, rubs or gallops.  · RESPIRATORY: Breath sounds clear and equal bilaterally.  · GASTROINTESTINAL: Abdomen soft, Suprapubic distention and tenderness to palpation without rebound  · MUSCULOSKELETAL: Spine appears normal, range of motion is not limited, no muscle or joint tenderness  · NEUROLOGICAL: Alert and oriented, no focal deficits, no motor or sensory deficits.  · SKIN: Skin normal color for race, warm, dry and intact. No evidence of rash

## 2024-06-18 NOTE — ED PROVIDER NOTE - PATIENT PORTAL LINK FT
You can access the FollowMyHealth Patient Portal offered by [facility] [place] by registering at the following website: [email or web address]. By joining [employer]’s FollowMyHealth portal, you will also be able to view your health information using other applications (apps) compatible with our system.

## 2024-06-20 ENCOUNTER — APPOINTMENT (OUTPATIENT)
Dept: UROLOGY | Facility: CLINIC | Age: 79
End: 2024-06-20
Payer: MEDICARE

## 2024-06-20 ENCOUNTER — APPOINTMENT (OUTPATIENT)
Dept: UROLOGY | Facility: CLINIC | Age: 79
End: 2024-06-20

## 2024-06-20 ENCOUNTER — EMERGENCY (EMERGENCY)
Facility: HOSPITAL | Age: 79
LOS: 0 days | Discharge: HOME | End: 2024-06-20
Attending: EMERGENCY MEDICINE
Payer: MEDICARE

## 2024-06-20 VITALS
WEIGHT: 215.39 LBS | HEIGHT: 66 IN | DIASTOLIC BLOOD PRESSURE: 75 MMHG | HEART RATE: 69 BPM | RESPIRATION RATE: 18 BRPM | OXYGEN SATURATION: 99 % | SYSTOLIC BLOOD PRESSURE: 167 MMHG | TEMPERATURE: 98 F

## 2024-06-20 VITALS
DIASTOLIC BLOOD PRESSURE: 80 MMHG | SYSTOLIC BLOOD PRESSURE: 150 MMHG | TEMPERATURE: 98 F | HEART RATE: 61 BPM | RESPIRATION RATE: 17 BRPM | OXYGEN SATURATION: 96 %

## 2024-06-20 VITALS
DIASTOLIC BLOOD PRESSURE: 74 MMHG | HEART RATE: 69 BPM | WEIGHT: 218 LBS | HEIGHT: 63 IN | SYSTOLIC BLOOD PRESSURE: 171 MMHG | BODY MASS INDEX: 38.62 KG/M2 | OXYGEN SATURATION: 98 %

## 2024-06-20 DIAGNOSIS — N41.0 ACUTE PROSTATITIS: ICD-10-CM

## 2024-06-20 DIAGNOSIS — R33.8 OTHER RETENTION OF URINE: ICD-10-CM

## 2024-06-20 DIAGNOSIS — N40.1 BENIGN PROSTATIC HYPERPLASIA WITH LOWER URINARY TRACT SYMPMS: ICD-10-CM

## 2024-06-20 DIAGNOSIS — N13.8 BENIGN PROSTATIC HYPERPLASIA WITH LOWER URINARY TRACT SYMPMS: ICD-10-CM

## 2024-06-20 LAB
ALBUMIN SERPL ELPH-MCNC: 3.6 G/DL — SIGNIFICANT CHANGE UP (ref 3.3–5)
ALP SERPL-CCNC: 58 U/L — SIGNIFICANT CHANGE UP (ref 40–120)
ALT FLD-CCNC: 28 U/L — SIGNIFICANT CHANGE UP (ref 12–78)
ANION GAP SERPL CALC-SCNC: 4 MMOL/L — LOW (ref 5–17)
APPEARANCE UR: CLEAR — SIGNIFICANT CHANGE UP
AST SERPL-CCNC: 24 U/L — SIGNIFICANT CHANGE UP (ref 15–37)
BACTERIA # UR AUTO: NEGATIVE /HPF — SIGNIFICANT CHANGE UP
BASOPHILS # BLD AUTO: 0.04 K/UL — SIGNIFICANT CHANGE UP (ref 0–0.2)
BASOPHILS NFR BLD AUTO: 0.6 % — SIGNIFICANT CHANGE UP (ref 0–2)
BILIRUB SERPL-MCNC: 0.8 MG/DL — SIGNIFICANT CHANGE UP (ref 0.2–1.2)
BILIRUB UR-MCNC: NEGATIVE — SIGNIFICANT CHANGE UP
BUN SERPL-MCNC: 15 MG/DL — SIGNIFICANT CHANGE UP (ref 7–23)
CALCIUM SERPL-MCNC: 9.5 MG/DL — SIGNIFICANT CHANGE UP (ref 8.5–10.1)
CAST: 0 /LPF — SIGNIFICANT CHANGE UP (ref 0–4)
CHLORIDE SERPL-SCNC: 108 MMOL/L — SIGNIFICANT CHANGE UP (ref 96–108)
CO2 SERPL-SCNC: 30 MMOL/L — SIGNIFICANT CHANGE UP (ref 22–31)
COLOR SPEC: YELLOW — SIGNIFICANT CHANGE UP
CREAT SERPL-MCNC: 1.03 MG/DL — SIGNIFICANT CHANGE UP (ref 0.5–1.3)
DIFF PNL FLD: ABNORMAL
EGFR: 75 ML/MIN/1.73M2 — SIGNIFICANT CHANGE UP
EOSINOPHIL # BLD AUTO: 0.22 K/UL — SIGNIFICANT CHANGE UP (ref 0–0.5)
EOSINOPHIL NFR BLD AUTO: 3.1 % — SIGNIFICANT CHANGE UP (ref 0–6)
GLUCOSE SERPL-MCNC: 109 MG/DL — HIGH (ref 70–99)
GLUCOSE UR QL: NEGATIVE MG/DL — SIGNIFICANT CHANGE UP
HCT VFR BLD CALC: 41.6 % — SIGNIFICANT CHANGE UP (ref 39–50)
HGB BLD-MCNC: 14.2 G/DL — SIGNIFICANT CHANGE UP (ref 13–17)
IMM GRANULOCYTES NFR BLD AUTO: 0.7 % — SIGNIFICANT CHANGE UP (ref 0–0.9)
KETONES UR-MCNC: NEGATIVE MG/DL — SIGNIFICANT CHANGE UP
LEUKOCYTE ESTERASE UR-ACNC: NEGATIVE — SIGNIFICANT CHANGE UP
LYMPHOCYTES # BLD AUTO: 1.09 K/UL — SIGNIFICANT CHANGE UP (ref 1–3.3)
LYMPHOCYTES # BLD AUTO: 15.5 % — SIGNIFICANT CHANGE UP (ref 13–44)
MCHC RBC-ENTMCNC: 34.1 GM/DL — SIGNIFICANT CHANGE UP (ref 32–36)
MCHC RBC-ENTMCNC: 34.4 PG — HIGH (ref 27–34)
MCV RBC AUTO: 100.7 FL — HIGH (ref 80–100)
MONOCYTES # BLD AUTO: 0.76 K/UL — SIGNIFICANT CHANGE UP (ref 0–0.9)
MONOCYTES NFR BLD AUTO: 10.8 % — SIGNIFICANT CHANGE UP (ref 2–14)
NEUTROPHILS # BLD AUTO: 4.88 K/UL — SIGNIFICANT CHANGE UP (ref 1.8–7.4)
NEUTROPHILS NFR BLD AUTO: 69.3 % — SIGNIFICANT CHANGE UP (ref 43–77)
NITRITE UR-MCNC: NEGATIVE — SIGNIFICANT CHANGE UP
PH UR: 8 — SIGNIFICANT CHANGE UP (ref 5–8)
PLATELET # BLD AUTO: 227 K/UL — SIGNIFICANT CHANGE UP (ref 150–400)
POTASSIUM SERPL-MCNC: 4.2 MMOL/L — SIGNIFICANT CHANGE UP (ref 3.5–5.3)
POTASSIUM SERPL-SCNC: 4.2 MMOL/L — SIGNIFICANT CHANGE UP (ref 3.5–5.3)
PROT SERPL-MCNC: 7.1 GM/DL — SIGNIFICANT CHANGE UP (ref 6–8.3)
PROT UR-MCNC: NEGATIVE MG/DL — SIGNIFICANT CHANGE UP
RBC # BLD: 4.13 M/UL — LOW (ref 4.2–5.8)
RBC # FLD: 12.7 % — SIGNIFICANT CHANGE UP (ref 10.3–14.5)
RBC CASTS # UR COMP ASSIST: 14 /HPF — HIGH (ref 0–4)
SODIUM SERPL-SCNC: 142 MMOL/L — SIGNIFICANT CHANGE UP (ref 135–145)
SP GR SPEC: 1.01 — SIGNIFICANT CHANGE UP (ref 1–1.03)
SQUAMOUS # UR AUTO: 0 /HPF — SIGNIFICANT CHANGE UP (ref 0–5)
UROBILINOGEN FLD QL: 0.2 MG/DL — SIGNIFICANT CHANGE UP (ref 0.2–1)
WBC # BLD: 7.04 K/UL — SIGNIFICANT CHANGE UP (ref 3.8–10.5)
WBC # FLD AUTO: 7.04 K/UL — SIGNIFICANT CHANGE UP (ref 3.8–10.5)
WBC UR QL: 1 /HPF — SIGNIFICANT CHANGE UP (ref 0–5)

## 2024-06-20 PROCEDURE — 99284 EMERGENCY DEPT VISIT MOD MDM: CPT

## 2024-06-20 PROCEDURE — 99203 OFFICE O/P NEW LOW 30 MIN: CPT

## 2024-06-20 PROCEDURE — 80053 COMPREHEN METABOLIC PANEL: CPT

## 2024-06-20 PROCEDURE — 36415 COLL VENOUS BLD VENIPUNCTURE: CPT

## 2024-06-20 PROCEDURE — 85025 COMPLETE CBC W/AUTO DIFF WBC: CPT

## 2024-06-20 PROCEDURE — 81001 URINALYSIS AUTO W/SCOPE: CPT

## 2024-06-20 PROCEDURE — 51702 INSERT TEMP BLADDER CATH: CPT

## 2024-06-20 PROCEDURE — 99283 EMERGENCY DEPT VISIT LOW MDM: CPT | Mod: 25

## 2024-06-20 NOTE — ED ADULT NURSE NOTE - OBJECTIVE STATEMENT
pt c/o urinary retention. pt had cook placed 2 days ago and became dislodged last night. pt appears uncomfortably and c/o abd pain, denies CP, SOB, back pain, n/v/d, fevers at this time.

## 2024-06-20 NOTE — ED ADULT NURSE REASSESSMENT NOTE - NS ED NURSE REASSESS COMMENT FT1
16fr cook catheter placed, draining clear yellow urine. pt tolerated procedure well. 16fr cook catheter placed, draining 1200ml clear yellow urine. pt tolerated procedure well.

## 2024-06-20 NOTE — ED ADULT TRIAGE NOTE - CHIEF COMPLAINT QUOTE
Pt ambulatory to ED c/o urinary retention. Pt had cook catheter placed 2x days ago. At 0800 yesterday his cook dislodged and came out. Pt is experiencing difficulty urinating since 0200 today. Pt has appointment with urologist outpatient today. Pt states this has happened to him before and he comes to ED to get new cook placed. Resp. even and unlabored, in NAD.

## 2024-06-20 NOTE — REVIEW OF SYSTEMS
[Negative] : Heme/Lymph [Eyesight Problems] : eyesight problems [Bladder pressure] : experiences bladder pressure [Slow urine stream] : slow urine stream [see HPI] : see HPI [Joint Pain] : joint pain [Joint Swelling] : joint swelling

## 2024-06-20 NOTE — ED PROVIDER NOTE - PROGRESS NOTE DETAILS
Sidney KAYE: Successful cook placement by RN with improvement of feeling of distention about 1200 cc of urine output, no blood, labs and UA unremarkable, follow up with urology at 830 am. jimenez

## 2024-06-20 NOTE — ED PROVIDER NOTE - NSFOLLOWUPINSTRUCTIONS_ED_ALL_ED_FT
Please follow up with your urologist today at 830 am. Please let them know of the events of the ER, and the results of your labs and imaging. Return to us immediately if you have any concerns if you can not urinate or if any fever or chills.     _________    Acute Urinary Retention, Male    Acute urinary retention is a condition in which a person is unable to pass urine or can only pass a little urine. This condition can happen suddenly and last for a short time. If left untreated, it can become long-term (chronic) and result in kidney damage or other serious complications.    What are the causes?  This condition may be caused by:  Obstruction or narrowing of the tube that drains the bladder (urethra). This may be caused by surgery, problems with nearby organs, or injury to the bladder or urethra.  Problems with the nerves in the bladder.  Tumors in the area of the pelvis, bladder, or urethra.  Certain medicines.  Bladder or urinary tract infection.  Constipation.  What increases the risk?  This condition is more likely to develop in older men. As men age, their prostate may become larger and may start to press or squeeze on the bladder or the urethra. Other chronic health conditions can increase the risk of acute urinary retention. These include:  Diseases such as multiple sclerosis.  Spinal cord injuries.  Diabetes.  Degenerative cognitive conditions, such as delirium or dementia.  Psychological conditions. A man may hold his urine due to trauma or because he does not want to use the bathroom.  What are the signs or symptoms?  Symptoms of this condition include:  Trouble urinating.  Pain in the lower abdomen.  How is this diagnosed?  This condition is diagnosed based on a physical exam and your medical history. You may also have other tests, including:  An ultrasound of the bladder or kidneys or both.  Blood tests.  A urine analysis.  Additional tests may be needed, such as a CT scan, MRI, and kidney or bladder function tests.  How is this treated?  Treatment for this condition may include:  Medicines.  Placing a thin, sterile tube (catheter) into the bladder to drain urine out of the body. This is called an indwelling urinary catheter. After it is inserted, the catheter is held in place with a small balloon that is filled with sterile water. Urine drains from the catheter into a collection bag outside of the body.  Behavioral therapy.  Treatment for other conditions.  If needed, you may be treated in the hospital for kidney function problems or to manage other complications.    Follow these instructions at home:  Medicines    Take over-the-counter and prescription medicines only as told by your health care provider. Avoid certain medicines, such as decongestants, antihistamines, and some prescription medicines. Do not take any medicine unless your health care provider approves.  If you were prescribed an antibiotic medicine, take it as told by your health care provider. Do not stop using the antibiotic even if you start to feel better.  General instructions    Do not use any products that contain nicotine or tobacco. These products include cigarettes, chewing tobacco, and vaping devices, such as e-cigarettes. If you need help quitting, ask your health care provider.  Drink enough fluid to keep your urine pale yellow.  If you have an indwelling urinary catheter, follow the instructions from your health care provider.  Monitor any changes in your symptoms. Tell your health care provider about any changes.  If instructed, monitor your blood pressure at home. Report changes as told by your health care provider.  Keep all follow-up visits. This is important.  Contact a health care provider if:  You have uncomfortable bladder contractions that you cannot control (spasms).  You leak urine with the spasms.  Get help right away if:  You have chills or a fever.  You have blood in your urine.  You have a catheter and the following happens:  Your catheter stops draining urine.  Your catheter falls out.  Summary  Acute urinary retention is a condition in which a person is unable to pass urine or can only pass a little urine. If left untreated, this condition can result in kidney damage or other serious complications.  An enlarged prostate may cause this condition. As men age, their prostate gland may become larger and may press or squeeze on the bladder or the urethra.  Treatment for this condition may include medicines and placement of an indwelling urinary catheter.  Monitor any changes in your symptoms. Tell your health care provider about any changes.  This information is not intended to replace advice given to you by your health care provider. Make sure you discuss any questions you have with your health care provider.

## 2024-06-20 NOTE — ED ADULT NURSE NOTE - NS_NURSE_DISC_TEACHING_YN_ED_ALL_ED
Callback x 2 to [phone] w/ no answer.  Was not to deliver PCP's message to pt, additional info needed to make final recommendation.    Will forward pt's initial message to PCP per [facility] protocol.   Yes

## 2024-06-20 NOTE — HISTORY OF PRESENT ILLNESS
[FreeTextEntry1] : 79 year old man seen 06/20/2024 with complaint of urinary retention. He was golfing, and his  fellover and hit him in the scrotum on LEFT. He had pain, but it resolved at that time. Slowly got worse, with swelling. He then developed inability to void. At , he had scrotal US which was c/w LEFT epididymitis. Cook was placed and >1000 mL were drained. Cook fall out yesterday, and again he could not void. Cook replaced this am in ER. 1200 mL drained. He is on finasteride and doxazosin at baseline. Tolerating cook. pain in scrotum moderate.

## 2024-06-20 NOTE — ED PROVIDER NOTE - CARE PROVIDER_API CALL
Stewart Parks  Urology  [address] [place] [id number]  Phone: [phone]  Fax: [phone]  Follow Up Time:

## 2024-06-20 NOTE — ED PROVIDER NOTE - PATIENT PORTAL LINK FT
You can access the FollowMyHealth Patient Portal offered by [facility] by registering at the following website: [email or web address]. By joining [employer]’s FollowMyHealth portal, you will also be able to view your health information using other applications (apps) compatible with our system.

## 2024-06-20 NOTE — PHYSICAL EXAM
[Normal Appearance] : normal appearance [Well Groomed] : well groomed [General Appearance - In No Acute Distress] : no acute distress [Edema] : no peripheral edema [Respiration, Rhythm And Depth] : normal respiratory rhythm and effort [Exaggerated Use Of Accessory Muscles For Inspiration] : no accessory muscle use [Abdomen Soft] : soft [Abdomen Tenderness] : non-tender [Costovertebral Angle Tenderness] : no ~M costovertebral angle tenderness [Urethral Meatus] : meatus normal [Penis Abnormality] : normal circumcised penis [Urinary Bladder Findings] : the bladder was normal on palpation [Testes Tenderness] : no tenderness of the testes [Testes Mass (___cm)] : there were no testicular masses [No Prostate Nodules] : no prostate nodules [Normal Station and Gait] : the gait and station were normal for the patient's age [] : no rash [No Focal Deficits] : no focal deficits [Oriented To Time, Place, And Person] : oriented to person, place, and time [Affect] : the affect was normal [Mood] : the mood was normal [No Palpable Adenopathy] : no palpable adenopathy [de-identified] : LEFT scrotal swelling, tender boggy prostate

## 2024-06-20 NOTE — ED PROVIDER NOTE - OBJECTIVE STATEMENT
79 year old male with PMH of recent visit to ear for retention of urine, received cook, at home, cook balloon blew and cook fell out, unable to urinate, here for reassess. No abdominal pain. No cp, sob or palpitation. Hx of BPH, has urology follow up at 830 at 6/20

## 2024-06-24 ENCOUNTER — APPOINTMENT (OUTPATIENT)
Dept: UROLOGY | Facility: CLINIC | Age: 79
End: 2024-06-24
Payer: MEDICARE

## 2024-06-24 ENCOUNTER — APPOINTMENT (OUTPATIENT)
Dept: UROLOGY | Facility: CLINIC | Age: 79
End: 2024-06-24

## 2024-06-24 DIAGNOSIS — N40.0 BENIGN PROSTATIC HYPERPLASIA WITHOUT LOWER URINARY TRACT SYMPMS: ICD-10-CM

## 2024-06-24 PROCEDURE — 99213 OFFICE O/P EST LOW 20 MIN: CPT

## 2024-06-25 ENCOUNTER — NON-APPOINTMENT (OUTPATIENT)
Age: 79
End: 2024-06-25

## 2024-06-25 ENCOUNTER — APPOINTMENT (OUTPATIENT)
Dept: RHEUMATOLOGY | Facility: CLINIC | Age: 79
End: 2024-06-25

## 2024-07-02 RX ORDER — CIPROFLOXACIN HYDROCHLORIDE 500 MG/1
500 TABLET, FILM COATED ORAL TWICE DAILY
Qty: 28 | Refills: 1 | Status: ACTIVE | COMMUNITY
Start: 2024-07-02 | End: 1900-01-01

## 2024-07-18 ENCOUNTER — APPOINTMENT (OUTPATIENT)
Dept: RHEUMATOLOGY | Facility: CLINIC | Age: 79
End: 2024-07-18

## 2024-07-18 ENCOUNTER — MED ADMIN CHARGE (OUTPATIENT)
Age: 79
End: 2024-07-18

## 2024-07-18 VITALS
RESPIRATION RATE: 17 BRPM | OXYGEN SATURATION: 98 % | TEMPERATURE: 98 F | SYSTOLIC BLOOD PRESSURE: 147 MMHG | HEART RATE: 58 BPM | DIASTOLIC BLOOD PRESSURE: 75 MMHG

## 2024-07-18 VITALS
RESPIRATION RATE: 17 BRPM | HEART RATE: 61 BPM | TEMPERATURE: 97 F | SYSTOLIC BLOOD PRESSURE: 154 MMHG | OXYGEN SATURATION: 98 % | DIASTOLIC BLOOD PRESSURE: 73 MMHG

## 2024-07-18 PROCEDURE — 96374 THER/PROPH/DIAG INJ IV PUSH: CPT | Mod: 59

## 2024-07-18 PROCEDURE — 96365 THER/PROPH/DIAG IV INF INIT: CPT

## 2024-07-18 PROCEDURE — 36415 COLL VENOUS BLD VENIPUNCTURE: CPT

## 2024-07-18 RX ORDER — CETIRIZINE HYDROCHLORIDE 10 MG/1
10 TABLET, COATED ORAL
Qty: 0 | Refills: 0 | Status: COMPLETED
Start: 2023-11-10

## 2024-07-18 RX ORDER — GOLIMUMAB 50 MG/4ML
50 SOLUTION INTRAVENOUS
Qty: 0 | Refills: 0 | Status: COMPLETED
Start: 2023-11-10

## 2024-07-18 RX ORDER — METHYLPREDNISOLONE 40 MG/ML
40 INJECTION, POWDER, LYOPHILIZED, FOR SOLUTION INTRAMUSCULAR; INTRAVENOUS
Qty: 1 | Refills: 0 | Status: COMPLETED
Start: 2023-11-10

## 2024-07-19 LAB
ALBUMIN SERPL ELPH-MCNC: 4.3 G/DL
ALP BLD-CCNC: 55 U/L
ALT SERPL-CCNC: 19 U/L
ANION GAP SERPL CALC-SCNC: 14 MMOL/L
AST SERPL-CCNC: 25 U/L
BILIRUB SERPL-MCNC: 0.7 MG/DL
BUN SERPL-MCNC: 17 MG/DL
CALCIUM SERPL-MCNC: 9.6 MG/DL
CHLORIDE SERPL-SCNC: 104 MMOL/L
CO2 SERPL-SCNC: 28 MMOL/L
CREAT SERPL-MCNC: 0.98 MG/DL
EGFR: 79 ML/MIN/1.73M2
ERYTHROCYTE [SEDIMENTATION RATE] IN BLOOD BY WESTERGREN METHOD: 12 MM/HR
GLUCOSE SERPL-MCNC: 88 MG/DL
HCT VFR BLD CALC: 41.8 %
HGB BLD-MCNC: 13.4 G/DL
MCHC RBC-ENTMCNC: 32.1 GM/DL
MCHC RBC-ENTMCNC: 34 PG
MCV RBC AUTO: 106.1 FL
PLATELET # BLD AUTO: 201 K/UL
POTASSIUM SERPL-SCNC: 4 MMOL/L
PROT SERPL-MCNC: 6.4 G/DL
RBC # BLD: 3.94 M/UL
RBC # FLD: 14.2 %
SODIUM SERPL-SCNC: 146 MMOL/L
WBC # FLD AUTO: 6.65 K/UL

## 2024-07-31 ENCOUNTER — APPOINTMENT (OUTPATIENT)
Dept: RHEUMATOLOGY | Facility: CLINIC | Age: 79
End: 2024-07-31
Payer: MEDICARE

## 2024-07-31 VITALS
TEMPERATURE: 97 F | BODY MASS INDEX: 38.27 KG/M2 | DIASTOLIC BLOOD PRESSURE: 78 MMHG | OXYGEN SATURATION: 96 % | SYSTOLIC BLOOD PRESSURE: 138 MMHG | HEIGHT: 63 IN | HEART RATE: 60 BPM | WEIGHT: 216 LBS

## 2024-07-31 DIAGNOSIS — N41.0 ACUTE PROSTATITIS: ICD-10-CM

## 2024-07-31 DIAGNOSIS — E66.9 OBESITY, UNSPECIFIED: ICD-10-CM

## 2024-07-31 DIAGNOSIS — M23.305 OTHER MENISCUS DERANGEMENTS, UNSPECIFIED MEDIAL MENISCUS, UNSPECIFIED KNEE: ICD-10-CM

## 2024-07-31 DIAGNOSIS — M06.4 INFLAMMATORY POLYARTHROPATHY: ICD-10-CM

## 2024-07-31 DIAGNOSIS — M47.819 SPONDYLOSIS W/OUT MYELOPATHY OR RADICULOPATHY, SITE UNSPECIFIED: ICD-10-CM

## 2024-07-31 PROCEDURE — 99214 OFFICE O/P EST MOD 30 MIN: CPT

## 2024-07-31 PROCEDURE — G2211 COMPLEX E/M VISIT ADD ON: CPT

## 2024-07-31 NOTE — REVIEW OF SYSTEMS
[Dry Eyes] : dryness of the eyes [Red Eyes] : red eyes [Wheezing] : wheezing [As Noted in HPI] : as noted in HPI [Negative] : Heme/Lymph [FreeTextEntry2] : overall feeling better  [FreeTextEntry7] : no evidence of IBD  [de-identified] : no obvious rash  [FreeTextEntry3] : chronically uses eye drop- anti histamines s; hx of chorioretinitis resolved w/ eye drops- NO IED  [FreeTextEntry5] : known systolic murmur- long standing  [FreeTextEntry6] : Dx CAR- severe - resolution of acute bronchitis (following COVID infection) [FreeTextEntry9] : limited ROM in his throughout his back - much improved w/ no active joint complaints

## 2024-07-31 NOTE — ASSESSMENT
[FreeTextEntry1] : 78 y/o m w/ a hx of sarcoidosis, BPH, HTN, lumbar disc disease (L4-5 L sided radiculopathy), BMI 39 w/ CAR  ? morphea lower abd as child... He is presenting in office for sudden onset of a migratory asymmetrical large joint pauciarthritis over the past several months.   SH:  Retired rheumatologist, , daily EtOH and infrequent cigar smoking  Acute prostatitis/ epidymitis:  trauma related.. 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  Now well controlled on Simponi Aria and MTX 7.5 mg.. will continue... tolerating fairly well  Interesting, review of images of the lumbar spine from 2017 revealed no inflammatory changes whatsoever. - MR 10/23- and moderate calcific tendinitis over R gluteus minimus and labs significant for elevated inflammatory markers- ESR while on steroids (40 mg pred) 38/ CRP 7, and Vectra 43- all fully resolved with treatment (steroids- DMARDs).  When active obvious pain and tenderness over L SI joint and migratory large joint arthropathy in past few months (shoulders/ knees and hips)... NOTE:  - Initial exam with marked limited ROM throughout spine as well.. with + Schober's and occiput to wall > 4"..with am stiffness lasting several hours, now fully resolved  Marked improvement now on 2 doses of Simponi Aria (12/23 started) and MTX 10 mg wkly.. only steroids with infusion and recently for acute bronchitis  high dose past few days ++ response.  -  Sclera bilaterally injected- chronically the history of chorioretinitis- reportedly not active at this time-> but notable improvement in degree of redness.  W/u for possible scleral psoriasis inconclusive- .. but absolutely not uveitis, or other obvious IED. Ongoing daily antihistamine for symptom management (definitely reports feeling better on treatment vs. when we had to hold ) No personal or FH psoriasis, AS, inflammatory back, bowel or eye dz but + FH SSc (severe) Positive response to steroids repeatedly. Precise etiology remains unclear but exam is highly suggestive for ankylosing spondylitis or a seronegative spondylarthritis vs seronegative RA (given+ FH- brother, excellent response to TNFi).   NOTE: INITIAL PRESENTATION: June 2023 he experience sudden onset of severe pain and swelling initially in the right knee lasted five days,  then R olectranon Bursa again lasted several days and eventually the left shoulder for several days  and now severe pain and limited range of motion  lumbar spine...now for nearly 2 wks...not resolving without steroids.  again last few days and has not resolved   Discussion:  precise ideology remains elusive, clearly inflammatory with elevated APR..and radiographic changes  Of note several antibodies are positive...but AVISE only + low titer ISRAEL, and TPO at 137   2) + Scl70 1.3/ RNP + 7.8: repeat on AVISE NEG. Needs repeat.  with ISRAEL... Is this because of a positive family history of systemic scleroderma?  Gives a distant history of a possible morphia as a child on the abdomen with no recurrence.  Absolutely denies mucositis, keratoconjunctivitis/ oral dryness, raynauds, serositis (no cardiopulmonary, HSM), no renal dysfunction, rash, alopecia, cytopenias, bleeding or clotting dyscrasias, sclerodactyl/ or pruritus 10/5/2023 CHEST XR was nl    3) Sarcoidosis:   had an episode back in the mid 70s classic Madeline syndrome pattern...  - could not get out of bed w/ severe SOB and a persistent cough, l periarticular ankle swelling, EN, and lost about 45-50 lbs in the process of figuring out his dx... No treatment was given resolve spontaneously over the course of several months  4) HNP w/ lumbar radiculopathy- not active but last MR 2017 with diffuse / extensive pathology... Repeatedly + response to DICK/ minimal improvement with PT.. - herniated L4-5 discs after 2 dalls over 10 years ago - took 3 years for him to get sensation back into his feet w/ a significant foot drop - everything is all back to normal, but does have some numbness in his big toes   5) HTN:  well controlled on current medication  6) Obesity:  BMI 38-> 37-> 38.. Is aware weights has been up and down over the years stable for the last several months. CAR: confirmed severe-> started on BiPAP with ++ response, feeling much better. ? GERD:  minimal complaints  AGE-APPROPRIATE SCREENING / MALIGNANCY- UTD on everything (need records) - last PSA was recently and nl  - gets colonoscopy every 2 years due to family hx of colon cancer (father)......most recent colonoscopy was nl  Health Mnt: annual flu vaccine 2023,  Prevnar 13 done, needs Prevnar 20 (confirmation needed) Pneumovax 23 needed (?2 doses??) Covid x4, no infections Shingrix Vaccine x 2 done need records RSV discussed, if dtr has children should consider  FH:  father MM and Colon Cancer   Plan - Continue at 7.5 mg MTX.. wants to minimize use   - labs - ideally with infusions  - continue folic acid 1 mg.....knows to increase if experiencing any SE  - continue simponi aria infusions w/ steroids 40 mg for now -  - need vaccination records: reportedly UTD on all..   - f/u every other infusion RPA   - Should consider PT to optimize mobility now that inflammatory process is controlled   -Is aware to call if there is any change in her underlying symptoms

## 2024-07-31 NOTE — DATA REVIEWED
[FreeTextEntry1] : Labs: 23 ESR 38 (on high dose steroids)/ CRP 7, Scl70 1.3, RNP 7.8, Vectra 43 - CBC normal within MCV of 102, CMP normal with a CO2 of 32, nl CK, ferritin, TSH, vitamin D 42, C3/C4, ACE Neg:  lyme/ tick borne panel, Hep B sAb + w/ neg Hep B sAg, Core Ab, HepC, RF/ CCP, ANGELA, dsDNA, SM, ANCA/ PR3/MPO  HLAB27 twice NEG years ago   Imagin/24 XR T/LS and Cervical++ flowing syndespomphytes at TS throughout entire LS- full Tspine BL (reviewed w/ Dr Frias- thin- suggestive of recent development.  Compared to MR LS   where none of this was seen)... and review of Cervical spine suggests possible shiny corners and complete loss lordosis with large anterior osteophytes.  Of note, disc space fairly well preserved.   10/30/23 MR Pelvis:  mild away of the hips and mild Si joints bilaterally 10/5/2023 CHEST XR was nl   23 MR of the right knee positive for a grade to sprain of the proximal medial collateral ligament with partial thickness tearing and periligamentous edema. Questionable oblique undersurface tear of the medial meniscus.  Mild DJD threw out with the trays baker cyst.  And mild distal quadriceps and insertional patellar tendoninosis

## 2024-07-31 NOTE — HISTORY OF PRESENT ILLNESS
[FreeTextEntry1] : 07/02/24 Overall marked improvement in current status back on Simponi Aria and MTX - at 7.5 mg with solumedrol ++ response at time of infusion.   -Recent severe infection epididymitis and prostatitis trauma induced.  Above treatments were held and Jose noticed return of diffuse stiffness which has since again fully resolved.  - updated labs with nl CBC, CMP, ESR...     -------------------- 05/21/24 updated labs.. overall improved with nl ESR/ CRP Still intermittent L SI joint TTT worse prior to infusion 4/30/24...well tolerated and absolutely feels better after the infusion w/ 40 mg solumedrol.. ..  - still no rash,  or IBD sx  - eyes still irritated with routine need for gtt but not inflammatory- severe dryness.  - lowered MTX to 7.5 mg.. and the stiffness/ local SI pain worse  - still no obvious rash...   1) Seronegative SpA   2) BMI 37 CAR:  moderate- severe   -------------- 2/8/2024  - doing well w/no active joint pain and minimal stiffness throughout entire back..  - doing well on MTX 4 pills and Simponi aria 2 doses w/ solumedrol 40 mg each infusion  - still no evidence of skin psoriasis; eyes conjunctiva continues to be red.. with no photophobia though.. slight improvement.. w/u possible scleral psoriasis.. pending eval next wk w/ opth- can not conclusively say this is psoraisis.. absolutely NO IED and routinely using anti histamine gtts.. with some but incomplete response.  - no recent wheezing, cp, sob..  - recently dx with severe CAR- started on BiPAP with ++ response, absolutely noted improved pulm function with much improved 6MWT  - no acute lumbar radiculopathy  .  - COVID infection 12/2023 and has full resolved with no sequelae  1) Seronegative SpA with axial involvement:  2) Obesity:  CAR  3) Lumbar radiculopathy _______________________________________________________________________________   Initial HPI 11/9/2023  77 y/o m w/ a hx of sarcoidosis, BPH, HTN, lumbar disc disease (L4-5 L sided radiculopathy). He is presenting in office for sudden onset of a migratory asymmetrical large joint pauciarthritis over the past several months.  June 2023 he experience sudden onset of severe pain and swelling initially in the right knee it lasted five days the right electron again last several days and eventually the left shoulder severe pain and limited range of motion again last few days and has not resolved.   Most recent severe pain is located in his left SI joint associated with profound stiffness and limited range of motion throughout the spine but most severe in the lower back.  .. He denies pain and warmth, but it was significantly swollen and lasted for a week and residual  right knee issues lasted for 4 weeks and he had to go to PT.  Shoulder and John on both resolve spontaneously  Denies mucositis, keratoconjunctivitis/ oral dryness, raynauds, serositis (no cardiopulmonary, HSM), no renal dysfunction, rash, alopecia, cytopenias, bleeding or clotting dyscrasias, sclerodactyl/ or pruritus  10/5/2023 CHEST XR was nl   [Note: received flu and new covid vaccines....not interested in getting RSV vaccine at this time]  - stiffness lasts all day - reports 80 % improvement on prednisone.....last dose was two weeks ago  SARCOIDOSIS  - had an episode back in the mid 70s  - could not get out of bed w/ severe SOB and a persistent cough  - dx w/ chorioretinitis treated w/ eye drops....has not been active for years and still intermittently uses eye drops - lost about 45-50 lbs in the process of figuring out his dx  HERNIATED DISCS - herniated L4-5 discs after 2 slips over 10 years ago - took 3 years for him to get sensation back into his feet w/ a significant foot drop - everything is all back to normal, but does have some numbness in his big toes  - has limited ROM in his back w/ difficulty twisting   AGE-APPROPRIATE SCREENING / MALIGNANCY - last PSA was recently and nl  - gets colonoscopy every 2 years due to family hx of colon cancer (father)......most recent colonoscopy was nl    SURGICAL HX - R hernia   SOCIAL HX - drinks occasionally and smokes cigars  FAMILY  - Scleroderma (mother) - multiple myeloma (father) - colon cancer (father) - seronegative erosive rheumatoid arthritis (brother)

## 2024-07-31 NOTE — REVIEW OF SYSTEMS
[Dry Eyes] : dryness of the eyes [Red Eyes] : red eyes [Wheezing] : wheezing [As Noted in HPI] : as noted in HPI [Negative] : Heme/Lymph [FreeTextEntry2] : overall feeling better  [FreeTextEntry7] : no evidence of IBD  [de-identified] : no obvious rash  [FreeTextEntry3] : chronically uses eye drop- anti histamines s; hx of chorioretinitis resolved w/ eye drops- NO IED  [FreeTextEntry5] : known systolic murmur- long standing  [FreeTextEntry6] : Dx CAR- severe - resolution of acute bronchitis (following COVID infection) [FreeTextEntry9] : limited ROM in his throughout his back - much improved w/ no active joint complaints

## 2024-07-31 NOTE — ASSESSMENT
[FreeTextEntry1] : 80 y/o m w/ a hx of sarcoidosis, BPH, HTN, lumbar disc disease (L4-5 L sided radiculopathy), BMI 39 w/ CAR  ? morphea lower abd as child... He is presenting in office for sudden onset of a migratory asymmetrical large joint pauciarthritis over the past several months.   SH:  Retired rheumatologist, , daily EtOH and infrequent cigar smoking  Acute prostatitis/ epidymitis:  trauma related.. 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  Now well controlled on Simponi Aria and MTX 7.5 mg.. will continue... tolerating fairly well  Interesting, review of images of the lumbar spine from 2017 revealed no inflammatory changes whatsoever. - MR 10/23- and moderate calcific tendinitis over R gluteus minimus and labs significant for elevated inflammatory markers- ESR while on steroids (40 mg pred) 38/ CRP 7, and Vectra 43- all fully resolved with treatment (steroids- DMARDs).  When active obvious pain and tenderness over L SI joint and migratory large joint arthropathy in past few months (shoulders/ knees and hips)... NOTE:  - Initial exam with marked limited ROM throughout spine as well.. with + Schober's and occiput to wall > 4"..with am stiffness lasting several hours, now fully resolved  Marked improvement now on 2 doses of Simponi Aria (12/23 started) and MTX 10 mg wkly.. only steroids with infusion and recently for acute bronchitis  high dose past few days ++ response.  -  Sclera bilaterally injected- chronically the history of chorioretinitis- reportedly not active at this time-> but notable improvement in degree of redness.  W/u for possible scleral psoriasis inconclusive- .. but absolutely not uveitis, or other obvious IED. Ongoing daily antihistamine for symptom management (definitely reports feeling better on treatment vs. when we had to hold ) No personal or FH psoriasis, AS, inflammatory back, bowel or eye dz but + FH SSc (severe) Positive response to steroids repeatedly. Precise etiology remains unclear but exam is highly suggestive for ankylosing spondylitis or a seronegative spondylarthritis vs seronegative RA (given+ FH- brother, excellent response to TNFi).   NOTE: INITIAL PRESENTATION: June 2023 he experience sudden onset of severe pain and swelling initially in the right knee lasted five days,  then R olectranon Bursa again lasted several days and eventually the left shoulder for several days  and now severe pain and limited range of motion  lumbar spine...now for nearly 2 wks...not resolving without steroids.  again last few days and has not resolved   Discussion:  precise ideology remains elusive, clearly inflammatory with elevated APR..and radiographic changes  Of note several antibodies are positive...but AVISE only + low titer ISRAEL, and TPO at 137   2) + Scl70 1.3/ RNP + 7.8: repeat on AVISE NEG. Needs repeat.  with ISRA[place]... Is this because of a positive family history of systemic scleroderma?  Gives a distant history of a possible morphia as a child on the abdomen with no recurrence.  Absolutely denies mucositis, keratoconjunctivitis/ oral dryness, raynauds, serositis (no cardiopulmonary, HSM), no renal dysfunction, rash, alopecia, cytopenias, bleeding or clotting dyscrasias, sclerodactyl/ or pruritus 10/5/2023 CHEST XR was nl    3) Sarcoidosis:   had an episode back in the mid 70s classic Madeline syndrome pattern...  - could not get out of bed w/ severe SOB and a persistent cough, l periarticular ankle swelling, EN, and lost about 45-50 lbs in the process of figuring out his dx... No treatment was given resolve spontaneously over the course of several months  4) HNP w/ lumbar radiculopathy- not active but last MR 2017 with diffuse / extensive pathology... Repeatedly + response to DICK/ minimal improvement with PT.. - herniated L4-5 discs after 2 dalls over 10 years ago - took 3 years for him to get sensation back into his feet w/ a significant foot drop - everything is all back to normal, but does have some numbness in his big toes   5) HTN:  well controlled on current medication  6) Obesity:  BMI 38-> 37-> 38.. Is aware weights has been up and down over the years stable for the last several months. CAR: confirmed severe-> started on BiPAP with ++ response, feeling much better. ? GERD:  minimal complaints  AGE-APPROPRIATE SCREENING / MALIGNANCY- UTD on everything (need records) - last PSA was recently and nl  - gets colonoscopy every 2 years due to family hx of colon cancer (father)......most recent colonoscopy was nl  Health Mnt: annual flu vaccine 2023,  Prevnar 13 done, needs Prevnar 20 (confirmation needed) Pneumovax 23 needed (?2 doses??) Covid x4, no infections Shingrix Vaccine x 2 done need records RSV discussed, if dtr has children should consider  FH:  father MM and Colon Cancer   Plan - Continue at 7.5 mg MTX.. wants to minimize use   - labs - ideally with infusions  - continue folic acid 1 mg.....knows to increase if experiencing any SE  - continue simponi aria infusions w/ steroids 40 mg for now -  - need vaccination records: reportedly UTD on all..   - f/u every other infusion RPA   - Should consider PT to optimize mobility now that inflammatory process is controlled   -Is aware to call if there is any change in her underlying symptoms

## 2024-07-31 NOTE — PHYSICAL EXAM
[General Appearance - Alert] : alert [General Appearance - In No Acute Distress] : in no acute distress [Sclera] : the sclera and conjunctiva were normal [Extraocular Movements] : extraocular movements were intact [PERRL With Normal Accommodation] : pupils were equal in size, round, and reactive to light [Outer Ear] : the ears and nose were normal in appearance [Oropharynx] : the oropharynx was normal [Neck Appearance] : the appearance of the neck was normal [Neck Cervical Mass (___cm)] : no neck mass was observed [Jugular Venous Distention Increased] : there was no jugular-venous distention [Thyroid Diffuse Enlargement] : the thyroid was not enlarged [Thyroid Nodule] : there were no palpable thyroid nodules [Respiration, Rhythm And Depth] : normal respiratory rhythm and effort [Auscultation Breath Sounds / Voice Sounds] : lungs were clear to auscultation bilaterally [Heart Rate And Rhythm] : heart rate was normal and rhythm regular [Heart Sounds] : normal S1 and S2 [Heart Sounds Gallop] : no gallops [Murmurs] : no murmurs [Heart Sounds Pericardial Friction Rub] : no pericardial rub [Edema] : there was no peripheral edema [Abdomen Soft] : soft [Abdomen Tenderness] : non-tender [Cervical Lymph Nodes Enlarged Anterior Bilaterally] : anterior cervical [Cervical Lymph Nodes Enlarged Posterior Bilaterally] : posterior cervical [Supraclavicular Lymph Nodes Enlarged Bilaterally] : supraclavicular [No CVA Tenderness] : no ~M costovertebral angle tenderness [No Spinal Tenderness] : no spinal tenderness [Skin Color & Pigmentation] : normal skin color and pigmentation [Skin Turgor] : normal skin turgor [] : no rash [Sensation] : the sensory exam was normal to light touch and pinprick [No Focal Deficits] : no focal deficits [Oriented To Time, Place, And Person] : oriented to person, place, and time [Impaired Insight] : insight and judgment were intact [Affect] : the affect was normal [Abnormal Walk] : normal gait [Nail Clubbing] : no clubbing  or cyanosis of the fingernails [Musculoskeletal - Swelling] : no joint swelling seen [Motor Tone] : muscle strength and tone were normal [FreeTextEntry1] : slight creps in bl knees w/ fROM; bl hips lacking 20-30-> 10-20 now  improved..  degrees L>R w/ NO POM on L-no dactylitis, or obvious joint effusion, minimal OA changes.

## 2024-08-12 ENCOUNTER — OFFICE (OUTPATIENT)
Dept: URBAN - METROPOLITAN AREA CLINIC 102 | Facility: CLINIC | Age: 79
Setting detail: OPHTHALMOLOGY
End: 2024-08-12
Payer: MEDICARE

## 2024-08-12 DIAGNOSIS — H01.001: ICD-10-CM

## 2024-08-12 DIAGNOSIS — H25.13: ICD-10-CM

## 2024-08-12 DIAGNOSIS — L40.59: ICD-10-CM

## 2024-08-12 DIAGNOSIS — H01.005: ICD-10-CM

## 2024-08-12 DIAGNOSIS — H35.363: ICD-10-CM

## 2024-08-12 DIAGNOSIS — H43.811: ICD-10-CM

## 2024-08-12 DIAGNOSIS — H01.004: ICD-10-CM

## 2024-08-12 DIAGNOSIS — H40.023: ICD-10-CM

## 2024-08-12 DIAGNOSIS — H16.223: ICD-10-CM

## 2024-08-12 DIAGNOSIS — H01.002: ICD-10-CM

## 2024-08-12 DIAGNOSIS — H40.033: ICD-10-CM

## 2024-08-12 PROCEDURE — 92134 CPTRZ OPH DX IMG PST SGM RTA: CPT | Performed by: OPHTHALMOLOGY

## 2024-08-12 PROCEDURE — 99214 OFFICE O/P EST MOD 30 MIN: CPT | Performed by: OPHTHALMOLOGY

## 2024-08-12 PROCEDURE — 92020 GONIOSCOPY: CPT | Performed by: OPHTHALMOLOGY

## 2024-08-12 ASSESSMENT — CONFRONTATIONAL VISUAL FIELD TEST (CVF)
OS_FINDINGS: FULL
OD_FINDINGS: FULL

## 2024-08-12 ASSESSMENT — LID EXAM ASSESSMENTS
OD_BLEPHARITIS: RLL RUL 1+
OS_BLEPHARITIS: LLL LUL 1+

## 2024-09-17 ENCOUNTER — APPOINTMENT (OUTPATIENT)
Dept: RHEUMATOLOGY | Facility: CLINIC | Age: 79
End: 2024-09-17
Payer: MEDICARE

## 2024-09-17 VITALS
TEMPERATURE: 97.2 F | DIASTOLIC BLOOD PRESSURE: 75 MMHG | HEART RATE: 68 BPM | SYSTOLIC BLOOD PRESSURE: 169 MMHG | OXYGEN SATURATION: 95 %

## 2024-09-17 VITALS — OXYGEN SATURATION: 95 % | SYSTOLIC BLOOD PRESSURE: 153 MMHG | DIASTOLIC BLOOD PRESSURE: 72 MMHG | HEART RATE: 59 BPM

## 2024-09-17 PROCEDURE — 36415 COLL VENOUS BLD VENIPUNCTURE: CPT

## 2024-09-17 PROCEDURE — 96365 THER/PROPH/DIAG IV INF INIT: CPT

## 2024-09-17 PROCEDURE — 96374 THER/PROPH/DIAG INJ IV PUSH: CPT | Mod: 59

## 2024-09-17 RX ORDER — CETIRIZINE HYDROCHLORIDE 10 MG/1
10 TABLET, COATED ORAL
Qty: 0 | Refills: 0 | Status: COMPLETED
Start: 2023-11-10

## 2024-09-17 RX ORDER — GOLIMUMAB 50 MG/4ML
50 SOLUTION INTRAVENOUS
Qty: 0 | Refills: 0 | Status: COMPLETED
Start: 2023-11-10

## 2024-09-17 RX ORDER — METHYLPREDNISOLONE 40 MG/ML
40 INJECTION, POWDER, LYOPHILIZED, FOR SOLUTION INTRAMUSCULAR; INTRAVENOUS
Qty: 1 | Refills: 0 | Status: COMPLETED
Start: 2023-11-10

## 2024-09-17 NOTE — HISTORY OF PRESENT ILLNESS
[1] : 1 [N/A] : N/A [Denies] : Denies [No] : No [Yes] : Yes [Declined] : Declined [Informed consent documented in EHR.] : Informed consent documented in EHR. [TB] : Tuberculosis screening [Hep acute panel] : Hepatitis acute panel [Total Hep B core AB] : total Hepatitis B Core antibody [de-identified] : LEFT SHOULDER [Left upper extremity] : Left upper extremity [24g] : 24g [Start Time: ___] : Medication Start Time: [unfilled] [End Time: ___] : Medication End Time: [unfilled] [Medication Name: ___] : Medication Name: [unfilled] [Total Amount Administered: ___] : Total Amount Administered: [unfilled] [IV discontinued. Intact. No signs or symptoms of IV complications noted. Time: ___] : IV discontinued. Intact. No signs or symptoms of IV complications noted. Time: [unfilled] [Patient  instructed to seek medical attention with signs and symptoms of adverse effects] : Patient  instructed to seek medical attention with signs and symptoms of adverse effects [Patient left unit in no acute distress] : Patient left unit in no acute distress [Medications administered as ordered and tolerated well.] : Medications administered as ordered and tolerated well. [Blood drawn at time of visit] : Blood drawn at time of visit [de-identified] : 11:06AM [de-identified] : NEXT INFUSION- 11/12/24 AT 08:30AM

## 2024-09-19 ENCOUNTER — OFFICE (OUTPATIENT)
Dept: URBAN - METROPOLITAN AREA CLINIC 102 | Facility: CLINIC | Age: 79
Setting detail: OPHTHALMOLOGY
End: 2024-09-19
Payer: MEDICARE

## 2024-09-19 DIAGNOSIS — L40.59: ICD-10-CM

## 2024-09-19 DIAGNOSIS — H01.001: ICD-10-CM

## 2024-09-19 DIAGNOSIS — H01.004: ICD-10-CM

## 2024-09-19 DIAGNOSIS — H01.002: ICD-10-CM

## 2024-09-19 DIAGNOSIS — H25.13: ICD-10-CM

## 2024-09-19 DIAGNOSIS — H40.023: ICD-10-CM

## 2024-09-19 DIAGNOSIS — H40.033: ICD-10-CM

## 2024-09-19 DIAGNOSIS — H35.363: ICD-10-CM

## 2024-09-19 DIAGNOSIS — H43.811: ICD-10-CM

## 2024-09-19 DIAGNOSIS — H01.005: ICD-10-CM

## 2024-09-19 DIAGNOSIS — H16.223: ICD-10-CM

## 2024-09-19 PROCEDURE — 99213 OFFICE O/P EST LOW 20 MIN: CPT | Performed by: OPHTHALMOLOGY

## 2024-09-19 ASSESSMENT — LID EXAM ASSESSMENTS
OD_COMMENTS: COLLARETTES
OS_COMMENTS: COLLARETTES
OS_BLEPHARITIS: LLL LUL 1+
OD_BLEPHARITIS: RLL RUL 1+

## 2024-09-19 ASSESSMENT — CONFRONTATIONAL VISUAL FIELD TEST (CVF)
OS_FINDINGS: FULL
OD_FINDINGS: FULL

## 2024-09-25 LAB
ALBUMIN SERPL ELPH-MCNC: 4.3 G/DL
ALP BLD-CCNC: 64 U/L
ALT SERPL-CCNC: 18 U/L
ANION GAP SERPL CALC-SCNC: 15 MMOL/L
AST SERPL-CCNC: 30 U/L
BILIRUB SERPL-MCNC: 0.8 MG/DL
BUN SERPL-MCNC: 19 MG/DL
CALCIUM SERPL-MCNC: 9.4 MG/DL
CHLORIDE SERPL-SCNC: 103 MMOL/L
CO2 SERPL-SCNC: 27 MMOL/L
CREAT SERPL-MCNC: 1.06 MG/DL
EGFR: 72 ML/MIN/1.73M2
ERYTHROCYTE [SEDIMENTATION RATE] IN BLOOD BY WESTERGREN METHOD: 13 MM/HR
GLUCOSE SERPL-MCNC: 99 MG/DL
HAV IGM SER QL: NONREACTIVE
HBV CORE IGG+IGM SER QL: NONREACTIVE
HBV CORE IGM SER QL: NONREACTIVE
HBV SURFACE AG SER QL: NONREACTIVE
HCT VFR BLD CALC: 43.6 %
HCV AB SER QL: NONREACTIVE
HCV S/CO RATIO: 0.19 S/CO
HGB BLD-MCNC: 14.1 G/DL
MCHC RBC-ENTMCNC: 32.3 GM/DL
MCHC RBC-ENTMCNC: 34.1 PG
MCV RBC AUTO: 105.6 FL
PLATELET # BLD AUTO: 235 K/UL
POTASSIUM SERPL-SCNC: 3.6 MMOL/L
PROT SERPL-MCNC: 6.5 G/DL
RBC # BLD: 4.13 M/UL
RBC # FLD: 14.2 %
SODIUM SERPL-SCNC: 145 MMOL/L
WBC # FLD AUTO: 7.1 K/UL

## 2024-11-04 ENCOUNTER — OFFICE (OUTPATIENT)
Dept: URBAN - METROPOLITAN AREA CLINIC 102 | Facility: CLINIC | Age: 79
Setting detail: OPHTHALMOLOGY
End: 2024-11-04
Payer: MEDICARE

## 2024-11-04 DIAGNOSIS — H01.004: ICD-10-CM

## 2024-11-04 DIAGNOSIS — H01.005: ICD-10-CM

## 2024-11-04 DIAGNOSIS — L40.59: ICD-10-CM

## 2024-11-04 DIAGNOSIS — H25.13: ICD-10-CM

## 2024-11-04 DIAGNOSIS — H01.002: ICD-10-CM

## 2024-11-04 DIAGNOSIS — H16.223: ICD-10-CM

## 2024-11-04 DIAGNOSIS — H40.023: ICD-10-CM

## 2024-11-04 DIAGNOSIS — H35.363: ICD-10-CM

## 2024-11-04 DIAGNOSIS — H01.001: ICD-10-CM

## 2024-11-04 DIAGNOSIS — H43.811: ICD-10-CM

## 2024-11-04 DIAGNOSIS — H40.033: ICD-10-CM

## 2024-11-04 PROCEDURE — 99213 OFFICE O/P EST LOW 20 MIN: CPT | Performed by: OPHTHALMOLOGY

## 2024-11-04 ASSESSMENT — TEAR BREAK UP TIME (TBUT)
OD_TBUT: T
OS_TBUT: T

## 2024-11-04 ASSESSMENT — KERATOMETRY
OS_AXISANGLE_DEGREES: 112
OD_K2POWER_DIOPTERS: 44.75
OD_AXISANGLE_DEGREES: 156
OS_K2POWER_DIOPTERS: 45.00
OS_CYLAXISANGLE_DEGREES: 022
OS_AXISANGLE2_DEGREES: 022
OD_AXISANGLE_DEGREES: 66
OS_K1POWER_DIOPTERS: 44.00
OD_CYLAXISANGLE_DEGREES: 156
OS_CYLPOWER_DEGREES: 1
OS_AXISANGLE_DEGREES: 022
OD_K1POWER_DIOPTERS: 44.25
OD_AXISANGLE2_DEGREES: 156
OD_CYLPOWER_DEGREES: 0.5
OD_K2POWER_DIOPTERS: 44.75
METHOD_AUTO_MANUAL: AUTO
OD_K1POWER_DIOPTERS: 44.25
OS_K1POWER_DIOPTERS: 44.00
OD_K1K2_AVERAGE: 44.5
OS_K1K2_AVERAGE: 44.5
OS_K2POWER_DIOPTERS: 45.00

## 2024-11-04 ASSESSMENT — REFRACTION_MANIFEST
OS_CYLINDER: -1.75
OD_AXIS: 052
OD_SPHERE: -0.25
OS_SPHERE: -0.50
OD_AXIS: 045
OD_CYLINDER: -1.50
OS_AXIS: 115
OD_SPHERE: -0.25
OS_AXIS: 110
OD_VA1: 20/30-2
OS_VA1: 20/50-2
OS_VA1: 20/50-
OD_CYLINDER: -1.75
OS_CYLINDER: -2.00
OS_SPHERE: -0.75
OD_VA1: 20/40

## 2024-11-04 ASSESSMENT — REFRACTION_CURRENTRX
OS_ADD: +2.50
OD_AXIS: 80
OD_CYLINDER: -1.25
OS_ADD: +2.50
OS_SPHERE: -0.50
OD_AXIS: 65
OD_CYLINDER: -1.25
OS_OVR_VA: 20/
OS_ADD: +2.50
OS_AXIS: 124
OD_CYLINDER: -1.25
OD_ADD: +2.50
OD_ADD: +2.50
OS_CYLINDER: -2.00
OS_SPHERE: -0.50
OS_CYLINDER: -1.00
OD_SPHERE: -0.75
OS_VPRISM_DIRECTION: PROGS
OD_OVR_VA: 20/
OS_AXIS: 120
OS_SPHERE: -1.00
OD_VPRISM_DIRECTION: PROGS
OD_AXIS: 90
OD_OVR_VA: 20/
OD_ADD: +2.50
OD_OVR_VA: 20/
OS_OVR_VA: 20/
OD_VPRISM_DIRECTION: PROGS
OS_CYLINDER: -2.00
OD_SPHERE: -1.00
OD_SPHERE: -1.25
OS_VPRISM_DIRECTION: PROGS
OS_OVR_VA: 20/
OS_AXIS: 110

## 2024-11-04 ASSESSMENT — CONFRONTATIONAL VISUAL FIELD TEST (CVF)
OD_FINDINGS: FULL
OS_FINDINGS: FULL

## 2024-11-04 ASSESSMENT — LID EXAM ASSESSMENTS
OS_BLEPHARITIS: LLL LUL T
OD_BLEPHARITIS: RLL RUL T

## 2024-11-04 ASSESSMENT — PACHYMETRY
OD_CT_UM: 563
OS_CT_CORRECTION: -1
OD_CT_CORRECTION: -1
OS_CT_UM: 565

## 2024-11-04 ASSESSMENT — REFRACTION_AUTOREFRACTION
OD_AXIS: 039
OS_CYLINDER: -2.00
OD_SPHERE: -0.25
OS_SPHERE: -0.50
OS_AXIS: 122
OD_CYLINDER: -2.25

## 2024-11-04 ASSESSMENT — VISUAL ACUITY
OS_BCVA: 20/40
OD_BCVA: 20/50-2

## 2024-11-04 ASSESSMENT — TONOMETRY
OD_IOP_MMHG: 17
OS_IOP_MMHG: 18

## 2024-11-11 DIAGNOSIS — M06.4 INFLAMMATORY POLYARTHROPATHY: ICD-10-CM

## 2024-11-12 ENCOUNTER — NON-APPOINTMENT (OUTPATIENT)
Age: 79
End: 2024-11-12

## 2024-11-12 ENCOUNTER — APPOINTMENT (OUTPATIENT)
Dept: RHEUMATOLOGY | Facility: CLINIC | Age: 79
End: 2024-11-12
Payer: MEDICARE

## 2024-11-12 LAB
ALBUMIN SERPL ELPH-MCNC: 3.8 G/DL
ALP BLD-CCNC: 58 U/L
ALT SERPL-CCNC: 33 U/L
ANION GAP SERPL CALC-SCNC: 11 MMOL/L
AST SERPL-CCNC: 28 U/L
BILIRUB SERPL-MCNC: 0.8 MG/DL
BUN SERPL-MCNC: 20 MG/DL
CALCIUM SERPL-MCNC: 9 MG/DL
CHLORIDE SERPL-SCNC: 102 MMOL/L
CO2 SERPL-SCNC: 31 MMOL/L
CREAT SERPL-MCNC: 1.06 MG/DL
EGFR: 72 ML/MIN/1.73M2
ERYTHROCYTE [SEDIMENTATION RATE] IN BLOOD BY WESTERGREN METHOD: 8 MM/HR
GLUCOSE SERPL-MCNC: 99 MG/DL
HCT VFR BLD CALC: 43.5 %
HGB BLD-MCNC: 14.6 G/DL
MCHC RBC-ENTMCNC: 33.6 G/DL
MCHC RBC-ENTMCNC: 33.7 PG
MCV RBC AUTO: 100.5 FL
PLATELET # BLD AUTO: 261 K/UL
POTASSIUM SERPL-SCNC: 3.3 MMOL/L
PROT SERPL-MCNC: 6.1 G/DL
RBC # BLD: 4.33 M/UL
RBC # FLD: 13.2 %
SODIUM SERPL-SCNC: 144 MMOL/L
WBC # FLD AUTO: 8.65 K/UL

## 2024-11-12 PROCEDURE — 36415 COLL VENOUS BLD VENIPUNCTURE: CPT

## 2024-11-12 PROCEDURE — 96365 THER/PROPH/DIAG IV INF INIT: CPT

## 2024-11-12 RX ORDER — GOLIMUMAB 50 MG/4ML
50 SOLUTION INTRAVENOUS
Refills: 0 | Status: ACTIVE | OUTPATIENT
Start: 2024-11-12 | End: 1900-01-01

## 2024-11-12 RX ORDER — ACETAMINOPHEN 325 MG/1
325 TABLET ORAL
Refills: 0 | Status: ACTIVE | OUTPATIENT
Start: 2024-11-12 | End: 1900-01-01

## 2024-11-12 RX ORDER — CETIRIZINE HYDROCHLORIDE 10 MG/1
10 TABLET, COATED ORAL
Refills: 0 | Status: ACTIVE | OUTPATIENT
Start: 2024-11-12 | End: 1900-01-01

## 2024-11-12 RX ORDER — CETIRIZINE HYDROCHLORIDE 10 MG/1
10 TABLET, COATED ORAL
Qty: 0 | Refills: 0 | Status: COMPLETED
Start: 2024-11-12

## 2024-11-12 RX ORDER — ACETAMINOPHEN 325 MG/1
325 TABLET ORAL
Qty: 0 | Refills: 0 | Status: COMPLETED
Start: 2024-11-12

## 2024-11-14 LAB
M TB IFN-G BLD-IMP: NEGATIVE
QUANTIFERON TB PLUS MITOGEN MINUS NIL: 7.39 IU/ML
QUANTIFERON TB PLUS NIL: 0.03 IU/ML
QUANTIFERON TB PLUS TB1 MINUS NIL: 0 IU/ML
QUANTIFERON TB PLUS TB2 MINUS NIL: 0.01 IU/ML

## 2024-11-26 ENCOUNTER — RX ONLY (RX ONLY)
Age: 79
End: 2024-11-26

## 2024-11-26 ENCOUNTER — APPOINTMENT (OUTPATIENT)
Dept: RHEUMATOLOGY | Facility: CLINIC | Age: 79
End: 2024-11-26
Payer: MEDICARE

## 2024-11-26 ENCOUNTER — OFFICE (OUTPATIENT)
Dept: URBAN - METROPOLITAN AREA CLINIC 102 | Facility: CLINIC | Age: 79
Setting detail: OPHTHALMOLOGY
End: 2024-11-26
Payer: MEDICARE

## 2024-11-26 VITALS
OXYGEN SATURATION: 97 % | DIASTOLIC BLOOD PRESSURE: 74 MMHG | WEIGHT: 231 LBS | TEMPERATURE: 96.8 F | HEART RATE: 96 BPM | HEIGHT: 63 IN | BODY MASS INDEX: 40.93 KG/M2 | SYSTOLIC BLOOD PRESSURE: 144 MMHG

## 2024-11-26 DIAGNOSIS — H01.002: ICD-10-CM

## 2024-11-26 DIAGNOSIS — H01.004: ICD-10-CM

## 2024-11-26 DIAGNOSIS — G47.33 OBSTRUCTIVE SLEEP APNEA (ADULT) (PEDIATRIC): ICD-10-CM

## 2024-11-26 DIAGNOSIS — H43.811: ICD-10-CM

## 2024-11-26 DIAGNOSIS — H40.033: ICD-10-CM

## 2024-11-26 DIAGNOSIS — E66.812 OBESITY, CLASS 2: ICD-10-CM

## 2024-11-26 DIAGNOSIS — H25.13: ICD-10-CM

## 2024-11-26 DIAGNOSIS — H01.001: ICD-10-CM

## 2024-11-26 DIAGNOSIS — L40.59: ICD-10-CM

## 2024-11-26 DIAGNOSIS — H01.005: ICD-10-CM

## 2024-11-26 DIAGNOSIS — H35.363: ICD-10-CM

## 2024-11-26 DIAGNOSIS — H25.12: ICD-10-CM

## 2024-11-26 DIAGNOSIS — H40.023: ICD-10-CM

## 2024-11-26 DIAGNOSIS — M06.00 RHEUMATOID ARTHRITIS W/OUT RHEUMATOID FACTOR, UNSPECIFIED SITE: ICD-10-CM

## 2024-11-26 DIAGNOSIS — H16.223: ICD-10-CM

## 2024-11-26 PROCEDURE — 99213 OFFICE O/P EST LOW 20 MIN: CPT

## 2024-11-26 PROCEDURE — G2211 COMPLEX E/M VISIT ADD ON: CPT

## 2024-11-26 PROCEDURE — 99213 OFFICE O/P EST LOW 20 MIN: CPT | Performed by: OPHTHALMOLOGY

## 2024-11-26 PROCEDURE — 92136 OPHTHALMIC BIOMETRY: CPT | Mod: TC | Performed by: OPHTHALMOLOGY

## 2024-11-26 PROCEDURE — 92136 OPHTHALMIC BIOMETRY: CPT | Mod: 26,LT | Performed by: OPHTHALMOLOGY

## 2024-11-26 ASSESSMENT — REFRACTION_CURRENTRX
OS_VPRISM_DIRECTION: PROGS
OD_OVR_VA: 20/
OD_AXIS: 90
OS_SPHERE: -0.50
OD_OVR_VA: 20/
OD_CYLINDER: -1.25
OS_CYLINDER: -2.00
OS_OVR_VA: 20/
OS_VPRISM_DIRECTION: PROGS
OS_AXIS: 120
OS_SPHERE: -1.00
OS_OVR_VA: 20/
OS_SPHERE: -0.50
OD_VPRISM_DIRECTION: PROGS
OS_CYLINDER: -2.00
OS_AXIS: 110
OS_OVR_VA: 20/
OD_ADD: +2.50
OS_AXIS: 124
OD_ADD: +2.50
OS_CYLINDER: -1.00
OS_ADD: +2.50
OD_CYLINDER: -1.25
OS_ADD: +2.50
OD_AXIS: 65
OD_AXIS: 80
OD_SPHERE: -0.75
OD_ADD: +2.50
OD_OVR_VA: 20/
OD_CYLINDER: -1.25
OD_SPHERE: -1.00
OD_SPHERE: -1.25
OS_ADD: +2.50
OD_VPRISM_DIRECTION: PROGS

## 2024-11-26 ASSESSMENT — VISUAL ACUITY
OD_BCVA: 20/60-2
OS_BCVA: 20/40

## 2024-11-26 ASSESSMENT — CONFRONTATIONAL VISUAL FIELD TEST (CVF)
OD_FINDINGS: FULL
OS_FINDINGS: FULL

## 2024-11-26 ASSESSMENT — KERATOMETRY
OS_AXISANGLE2_DEGREES: 014
METHOD_AUTO_MANUAL: AUTO
OS_CYLAXISANGLE_DEGREES: 014
OS_K2POWER_DIOPTERS: 45.00
OD_CYLPOWER_DEGREES: 0.5
OS_CYLPOWER_DEGREES: 1.25
OD_K2POWER_DIOPTERS: 44.50
OD_CYLAXISANGLE_DEGREES: 155
OS_AXISANGLE_DEGREES: 014
OS_K2POWER_DIOPTERS: 45.00
OS_K1POWER_DIOPTERS: 43.75
OD_K1POWER_DIOPTERS: 44.00
OD_K2POWER_DIOPTERS: 44.50
OD_AXISANGLE_DEGREES: 65
OD_AXISANGLE2_DEGREES: 155
OS_K1POWER_DIOPTERS: 43.75
OS_K1K2_AVERAGE: 44.375
OD_K1K2_AVERAGE: 44.25
OD_K1POWER_DIOPTERS: 44.00
OD_AXISANGLE_DEGREES: 155
OS_AXISANGLE_DEGREES: 104

## 2024-11-26 ASSESSMENT — TEAR BREAK UP TIME (TBUT)
OD_TBUT: T
OS_TBUT: T

## 2024-11-26 ASSESSMENT — LID EXAM ASSESSMENTS
OD_BLEPHARITIS: RLL RUL T
OS_BLEPHARITIS: LLL LUL T

## 2024-11-26 ASSESSMENT — TONOMETRY
OS_IOP_MMHG: 18
OD_IOP_MMHG: 21

## 2024-11-26 ASSESSMENT — REFRACTION_MANIFEST
OD_AXIS: 052
OD_AXIS: 045
OS_VA1: 20/50-
OD_CYLINDER: -1.75
OD_SPHERE: -0.25
OD_CYLINDER: -1.50
OS_AXIS: 115
OS_AXIS: 110
OS_VA1: 20/50-2
OD_VA1: 20/30-2
OS_SPHERE: -0.50
OD_SPHERE: -0.25
OD_VA1: 20/40
OS_CYLINDER: -2.00
OS_SPHERE: -0.75
OS_CYLINDER: -1.75

## 2024-11-26 ASSESSMENT — REFRACTION_AUTOREFRACTION
OD_AXIS: 054
OS_SPHERE: -0.25
OS_AXIS: 110
OD_SPHERE: -0.75
OD_CYLINDER: -1.75
OS_CYLINDER: -1.75

## 2024-11-26 ASSESSMENT — PACHYMETRY
OD_CT_CORRECTION: -1
OS_CT_UM: 565
OD_CT_UM: 563
OS_CT_CORRECTION: -1

## 2024-12-03 PROBLEM — G47.33 OSA (OBSTRUCTIVE SLEEP APNEA): Status: ACTIVE | Noted: 2024-12-03

## 2024-12-03 PROBLEM — E66.812 OBESITY, CLASS II, BMI 35-39.9: Status: ACTIVE | Noted: 2024-07-31

## 2024-12-04 ENCOUNTER — ASC (OUTPATIENT)
Dept: URBAN - METROPOLITAN AREA SURGERY 8 | Facility: SURGERY | Age: 79
Setting detail: OPHTHALMOLOGY
End: 2024-12-04
Payer: MEDICARE

## 2024-12-04 DIAGNOSIS — H52.212: ICD-10-CM

## 2024-12-04 DIAGNOSIS — H25.12: ICD-10-CM

## 2024-12-04 PROCEDURE — A9270 NON-COVERED ITEM OR SERVICE: HCPCS | Mod: GY | Performed by: OPHTHALMOLOGY

## 2024-12-04 PROCEDURE — 66984 XCAPSL CTRC RMVL W/O ECP: CPT | Mod: LT | Performed by: OPHTHALMOLOGY

## 2024-12-04 PROCEDURE — FEMTO PRECISION LASER CATARACT SURGERY: Mod: GY | Performed by: OPHTHALMOLOGY

## 2024-12-04 ASSESSMENT — LID EXAM ASSESSMENTS
OD_BLEPHARITIS: RLL RUL T
OS_BLEPHARITIS: LLL LUL T

## 2024-12-04 ASSESSMENT — CORNEAL EDEMA CLINICAL DESCRIPTION: OS_CORNEALEDEMA: T

## 2024-12-05 ENCOUNTER — RX ONLY (RX ONLY)
Age: 79
End: 2024-12-05

## 2024-12-05 ENCOUNTER — TELEHEALTH-OTHER THEN HOME (OUTPATIENT)
Age: 79
Setting detail: OPHTHALMOLOGY
End: 2024-12-05
Payer: MEDICARE

## 2024-12-05 DIAGNOSIS — Z96.1: ICD-10-CM

## 2024-12-05 PROCEDURE — 99024 POSTOP FOLLOW-UP VISIT: CPT | Performed by: OPHTHALMOLOGY

## 2024-12-05 ASSESSMENT — REFRACTION_CURRENTRX
OD_OVR_VA: 20/
OS_OVR_VA: 20/
OD_VPRISM_DIRECTION: PROGS
OD_CYLINDER: -1.25
OS_VPRISM_DIRECTION: PROGS
OS_VPRISM_DIRECTION: PROGS
OD_ADD: +2.50
OS_AXIS: 120
OD_SPHERE: -0.75
OS_AXIS: 124
OD_AXIS: 80
OD_OVR_VA: 20/
OS_SPHERE: -0.50
OD_CYLINDER: -1.25
OD_AXIS: 90
OD_VPRISM_DIRECTION: PROGS
OS_OVR_VA: 20/
OS_CYLINDER: -2.00
OS_OVR_VA: 20/
OD_CYLINDER: -1.25
OS_ADD: +2.50
OD_OVR_VA: 20/
OS_SPHERE: -0.50
OD_AXIS: 65
OD_SPHERE: -1.00
OS_ADD: +2.50
OS_AXIS: 110
OD_ADD: +2.50
OS_ADD: +2.50
OS_SPHERE: -1.00
OS_CYLINDER: -1.00
OD_ADD: +2.50
OS_CYLINDER: -2.00
OD_SPHERE: -1.25

## 2024-12-05 ASSESSMENT — VISUAL ACUITY
OS_BCVA: 20/40+
OD_BCVA: 20/50+

## 2024-12-05 ASSESSMENT — KERATOMETRY
OD_AXISANGLE_DEGREES: 169
OS_K2POWER_DIOPTERS: 44.25
METHOD_AUTO_MANUAL: AUTO
OD_K1POWER_DIOPTERS: 44.00
OS_AXISANGLE_DEGREES: 012
OS_K1POWER_DIOPTERS: 44.00
OD_K2POWER_DIOPTERS: 44.50

## 2024-12-05 ASSESSMENT — CONFRONTATIONAL VISUAL FIELD TEST (CVF)
OS_FINDINGS: FULL
OD_FINDINGS: FULL

## 2024-12-05 ASSESSMENT — REFRACTION_MANIFEST
OD_CYLINDER: -1.50
OS_CYLINDER: -2.00
OS_AXIS: 115
OS_AXIS: 110
OS_VA1: 20/50-2
OD_AXIS: 045
OS_VA1: 20/50-
OD_VA1: 20/40
OD_VA1: 20/30-2
OD_SPHERE: -0.25
OD_SPHERE: -0.25
OS_SPHERE: -0.50
OD_AXIS: 052
OS_CYLINDER: -1.75
OS_SPHERE: -0.75
OD_CYLINDER: -1.75

## 2024-12-05 ASSESSMENT — REFRACTION_AUTOREFRACTION
OD_SPHERE: 0.00
OS_AXIS: 087
OD_AXIS: 046
OD_CYLINDER: -2.25
OS_SPHERE: +0.50
OS_CYLINDER: -0.75

## 2024-12-05 ASSESSMENT — PACHYMETRY
OD_CT_UM: 563
OD_CT_CORRECTION: -1
OS_CT_CORRECTION: -1
OS_CT_UM: 565

## 2024-12-05 ASSESSMENT — TONOMETRY: OD_IOP_MMHG: 19

## 2024-12-11 ASSESSMENT — LID EXAM ASSESSMENTS
OD_BLEPHARITIS: RLL RUL T
OS_BLEPHARITIS: LLL LUL T

## 2024-12-12 ENCOUNTER — OFFICE (OUTPATIENT)
Dept: URBAN - METROPOLITAN AREA CLINIC 102 | Facility: CLINIC | Age: 79
Setting detail: OPHTHALMOLOGY
End: 2024-12-12
Payer: MEDICARE

## 2024-12-12 DIAGNOSIS — H25.11: ICD-10-CM

## 2024-12-12 PROCEDURE — 92136 OPHTHALMIC BIOMETRY: CPT | Mod: 26,RT | Performed by: OPHTHALMOLOGY

## 2024-12-12 ASSESSMENT — REFRACTION_CURRENTRX
OD_VPRISM_DIRECTION: PROGS
OD_SPHERE: -1.25
OD_ADD: +2.50
OD_AXIS: 80
OD_CYLINDER: -1.25
OS_CYLINDER: -2.00
OD_OVR_VA: 20/
OS_AXIS: 110
OD_VPRISM_DIRECTION: PROGS
OS_SPHERE: -1.00
OD_ADD: +2.50
OS_SPHERE: -0.50
OD_AXIS: 90
OS_VPRISM_DIRECTION: PROGS
OD_AXIS: 65
OD_SPHERE: -0.75
OD_OVR_VA: 20/
OD_ADD: +2.50
OS_OVR_VA: 20/
OD_OVR_VA: 20/
OS_VPRISM_DIRECTION: PROGS
OS_AXIS: 120
OS_ADD: +2.50
OD_SPHERE: -1.00
OD_CYLINDER: -1.25
OS_OVR_VA: 20/
OS_CYLINDER: -1.00
OS_CYLINDER: -2.00
OD_CYLINDER: -1.25
OS_SPHERE: -0.50
OS_OVR_VA: 20/
OS_ADD: +2.50
OS_AXIS: 124
OS_ADD: +2.50

## 2024-12-12 ASSESSMENT — TONOMETRY
OS_IOP_MMHG: 16
OD_IOP_MMHG: 19

## 2024-12-12 ASSESSMENT — KERATOMETRY
OS_AXISANGLE_DEGREES: 021
OS_K1POWER_DIOPTERS: 44.00
OS_K2POWER_DIOPTERS: 44.50
OD_AXISANGLE_DEGREES: 001
OD_K2POWER_DIOPTERS: 44.50
OD_K1POWER_DIOPTERS: 43.75
METHOD_AUTO_MANUAL: AUTO

## 2024-12-12 ASSESSMENT — REFRACTION_MANIFEST
OD_CYLINDER: -1.75
OS_CYLINDER: -2.00
OD_VA1: 20/30-2
OD_VA1: 20/40
OS_SPHERE: -0.75
OD_AXIS: 052
OS_SPHERE: -0.50
OS_AXIS: 115
OD_SPHERE: -0.25
OD_CYLINDER: -1.50
OD_AXIS: 045
OS_VA1: 20/50-2
OS_AXIS: 110
OS_VA1: 20/50-
OS_CYLINDER: -1.75
OD_SPHERE: -0.25

## 2024-12-12 ASSESSMENT — PACHYMETRY
OS_CT_UM: 565
OD_CT_UM: 563
OD_CT_CORRECTION: -1
OS_CT_CORRECTION: -1

## 2024-12-12 ASSESSMENT — REFRACTION_AUTOREFRACTION
OS_CYLINDER: -1.00
OD_SPHERE: 0.00
OD_AXIS: 062
OS_SPHERE: +0.50
OD_CYLINDER: -1.50
OS_AXIS: 114

## 2024-12-12 ASSESSMENT — VISUAL ACUITY
OS_BCVA: 20/40-1
OD_BCVA: 20/25-1

## 2024-12-12 ASSESSMENT — CONFRONTATIONAL VISUAL FIELD TEST (CVF)
OS_FINDINGS: FULL
OD_FINDINGS: FULL

## 2024-12-18 ENCOUNTER — ASC (OUTPATIENT)
Dept: URBAN - METROPOLITAN AREA SURGERY 8 | Facility: SURGERY | Age: 79
Setting detail: OPHTHALMOLOGY
End: 2024-12-18
Payer: MEDICARE

## 2024-12-18 DIAGNOSIS — H25.11: ICD-10-CM

## 2024-12-18 DIAGNOSIS — H52.211: ICD-10-CM

## 2024-12-18 PROCEDURE — A9270 NON-COVERED ITEM OR SERVICE: HCPCS | Mod: GY,RT | Performed by: OPHTHALMOLOGY

## 2024-12-18 PROCEDURE — 66984 XCAPSL CTRC RMVL W/O ECP: CPT | Mod: 79,RT | Performed by: OPHTHALMOLOGY

## 2024-12-18 PROCEDURE — FEMTO PRECISION LASER CATARACT SURGERY: Mod: GY,RT | Performed by: OPHTHALMOLOGY

## 2024-12-18 ASSESSMENT — LID EXAM ASSESSMENTS
OD_BLEPHARITIS: RLL RUL T
OS_BLEPHARITIS: LLL LUL T

## 2024-12-18 ASSESSMENT — CORNEAL EDEMA CLINICAL DESCRIPTION: OD_CORNEALEDEMA: T

## 2024-12-19 ENCOUNTER — RX ONLY (RX ONLY)
Age: 79
End: 2024-12-19

## 2024-12-19 ENCOUNTER — OFFICE (OUTPATIENT)
Dept: URBAN - METROPOLITAN AREA CLINIC 102 | Facility: CLINIC | Age: 79
Setting detail: OPHTHALMOLOGY
End: 2024-12-19
Payer: MEDICARE

## 2024-12-19 DIAGNOSIS — Z96.1: ICD-10-CM

## 2024-12-19 PROCEDURE — 99024 POSTOP FOLLOW-UP VISIT: CPT | Performed by: OPHTHALMOLOGY

## 2024-12-19 ASSESSMENT — REFRACTION_CURRENTRX
OS_VPRISM_DIRECTION: PROGS
OS_ADD: +2.50
OD_ADD: +2.50
OD_OVR_VA: 20/
OS_ADD: +2.50
OS_SPHERE: -1.00
OS_CYLINDER: -2.00
OD_OVR_VA: 20/
OD_SPHERE: -1.00
OD_OVR_VA: 20/
OD_ADD: +2.50
OD_VPRISM_DIRECTION: PROGS
OS_AXIS: 124
OS_SPHERE: -0.50
OD_VPRISM_DIRECTION: PROGS
OS_OVR_VA: 20/
OD_SPHERE: -1.25
OS_VPRISM_DIRECTION: PROGS
OS_AXIS: 110
OD_SPHERE: -0.75
OD_CYLINDER: -1.25
OS_ADD: +2.50
OS_SPHERE: -0.50
OS_AXIS: 120
OS_OVR_VA: 20/
OD_ADD: +2.50
OD_AXIS: 90
OS_CYLINDER: -1.00
OD_CYLINDER: -1.25
OD_AXIS: 80
OD_CYLINDER: -1.25
OS_CYLINDER: -2.00
OD_AXIS: 65
OS_OVR_VA: 20/

## 2024-12-19 ASSESSMENT — VISUAL ACUITY
OD_BCVA: 20/25-1
OS_BCVA: 20/40-1

## 2024-12-19 ASSESSMENT — REFRACTION_AUTOREFRACTION
OS_AXIS: 110
OD_AXIS: 004
OD_SPHERE: +0.25
OD_CYLINDER: -0.50
OS_SPHERE: +0.25
OS_CYLINDER: -0.75

## 2024-12-19 ASSESSMENT — PACHYMETRY
OS_CT_UM: 565
OD_CT_CORRECTION: -1
OS_CT_CORRECTION: -1
OD_CT_UM: 563

## 2024-12-19 ASSESSMENT — KERATOMETRY
OS_K2POWER_DIOPTERS: 44.50
OD_K1POWER_DIOPTERS: 43.75
OD_AXISANGLE_DEGREES: 108
OD_K2POWER_DIOPTERS: 44.00
OS_K1POWER_DIOPTERS: 43.75
METHOD_AUTO_MANUAL: AUTO
OS_AXISANGLE_DEGREES: 021

## 2024-12-19 ASSESSMENT — REFRACTION_MANIFEST
OS_AXIS: 115
OS_VA1: 20/50-2
OD_AXIS: 045
OS_SPHERE: -0.75
OD_SPHERE: -0.25
OD_VA1: 20/40
OS_VA1: 20/50-
OS_CYLINDER: -2.00
OD_CYLINDER: -1.75
OS_AXIS: 110
OD_VA1: 20/30-2
OD_AXIS: 052
OD_SPHERE: -0.25
OS_SPHERE: -0.50
OD_CYLINDER: -1.50
OS_CYLINDER: -1.75

## 2024-12-19 ASSESSMENT — CONFRONTATIONAL VISUAL FIELD TEST (CVF)
OS_FINDINGS: FULL
OD_FINDINGS: FULL

## 2024-12-19 ASSESSMENT — TONOMETRY: OS_IOP_MMHG: 16

## 2024-12-24 ENCOUNTER — OFFICE (OUTPATIENT)
Dept: URBAN - METROPOLITAN AREA CLINIC 102 | Facility: CLINIC | Age: 79
Setting detail: OPHTHALMOLOGY
End: 2024-12-24
Payer: MEDICARE

## 2024-12-24 ENCOUNTER — RX ONLY (RX ONLY)
Age: 79
End: 2024-12-24

## 2024-12-24 DIAGNOSIS — Z96.1: ICD-10-CM

## 2024-12-24 PROBLEM — H40.031 NARROW ANGLE GLAUCOMA SUSPECT ;  , RIGHT EYE: Status: ACTIVE | Noted: 2024-12-04

## 2024-12-24 PROBLEM — H25.11 CATARACT SENILE NUCLEAR SCLEROSIS; RIGHT EYE: Status: ACTIVE | Noted: 2024-12-04

## 2024-12-24 PROCEDURE — 99024 POSTOP FOLLOW-UP VISIT: CPT | Performed by: OPHTHALMOLOGY

## 2024-12-24 ASSESSMENT — LID EXAM ASSESSMENTS
OD_BLEPHARITIS: RLL RUL T
OS_BLEPHARITIS: LLL LUL T

## 2024-12-24 ASSESSMENT — REFRACTION_CURRENTRX
OS_VPRISM_DIRECTION: PROGS
OS_SPHERE: -0.50
OD_SPHERE: -0.75
OS_OVR_VA: 20/
OD_CYLINDER: -1.25
OD_VPRISM_DIRECTION: PROGS
OD_VPRISM_DIRECTION: PROGS
OD_AXIS: 80
OD_CYLINDER: -1.25
OS_CYLINDER: -2.00
OS_SPHERE: -0.50
OS_ADD: +2.50
OD_SPHERE: -1.00
OS_CYLINDER: -2.00
OD_SPHERE: -1.25
OS_OVR_VA: 20/
OS_AXIS: 120
OD_OVR_VA: 20/
OS_SPHERE: -1.00
OD_OVR_VA: 20/
OS_ADD: +2.50
OD_ADD: +2.50
OD_AXIS: 65
OS_CYLINDER: -1.00
OD_OVR_VA: 20/
OD_AXIS: 90
OD_CYLINDER: -1.25
OD_ADD: +2.50
OS_AXIS: 110
OS_ADD: +2.50
OS_AXIS: 124
OS_VPRISM_DIRECTION: PROGS
OS_OVR_VA: 20/
OD_ADD: +2.50

## 2024-12-24 ASSESSMENT — REFRACTION_MANIFEST
OD_CYLINDER: -1.75
OD_AXIS: 045
OS_VA1: 20/50-2
OD_SPHERE: -0.25
OS_VA1: 20/50-
OS_SPHERE: -0.75
OS_CYLINDER: -2.00
OD_VA1: 20/30-2
OD_SPHERE: -0.25
OS_AXIS: 115
OS_AXIS: 110
OS_CYLINDER: -1.75
OD_VA1: 20/40
OD_CYLINDER: -1.50
OD_AXIS: 052
OS_SPHERE: -0.50

## 2024-12-24 ASSESSMENT — TONOMETRY
OD_IOP_MMHG: 17
OS_IOP_MMHG: 16

## 2024-12-24 ASSESSMENT — KERATOMETRY
OD_K1POWER_DIOPTERS: 43.75
OS_AXISANGLE_DEGREES: 019
OD_AXISANGLE_DEGREES: 141
OD_K2POWER_DIOPTERS: 44.25
OS_K1POWER_DIOPTERS: 44.00
OS_K2POWER_DIOPTERS: 44.75
METHOD_AUTO_MANUAL: AUTO

## 2024-12-24 ASSESSMENT — PACHYMETRY
OD_CT_CORRECTION: -1
OS_CT_UM: 565
OD_CT_UM: 563
OS_CT_CORRECTION: -1

## 2024-12-24 ASSESSMENT — VISUAL ACUITY
OS_BCVA: 20/25
OD_BCVA: 20/25-1

## 2024-12-24 ASSESSMENT — CONFRONTATIONAL VISUAL FIELD TEST (CVF)
OD_FINDINGS: FULL
OS_FINDINGS: FULL

## 2024-12-24 ASSESSMENT — REFRACTION_AUTOREFRACTION
OD_AXIS: 083
OS_AXIS: 106
OS_SPHERE: +0.25
OD_CYLINDER: -0.50
OD_SPHERE: +0.25
OS_CYLINDER: -1.00

## 2024-12-30 NOTE — END OF VISIT
Left message for patient to return call in regard to sleep testing @ [phone]   [Time Spent: ___ minutes] : I have spent [unfilled] minutes of time on the encounter.

## 2025-01-07 ENCOUNTER — APPOINTMENT (OUTPATIENT)
Dept: RHEUMATOLOGY | Facility: CLINIC | Age: 80
End: 2025-01-07
Payer: MEDICARE

## 2025-01-07 VITALS
OXYGEN SATURATION: 98 % | HEART RATE: 98 BPM | SYSTOLIC BLOOD PRESSURE: 170 MMHG | DIASTOLIC BLOOD PRESSURE: 78 MMHG | TEMPERATURE: 97.2 F

## 2025-01-07 PROCEDURE — 96365 THER/PROPH/DIAG IV INF INIT: CPT

## 2025-01-07 PROCEDURE — 36415 COLL VENOUS BLD VENIPUNCTURE: CPT

## 2025-01-07 RX ORDER — CETIRIZINE HYDROCHLORIDE 10 MG/1
10 TABLET, COATED ORAL
Qty: 0 | Refills: 0 | Status: COMPLETED
Start: 2024-11-12

## 2025-01-07 RX ORDER — GOLIMUMAB 50 MG/4ML
50 SOLUTION INTRAVENOUS
Qty: 0 | Refills: 0 | Status: COMPLETED
Start: 2024-11-12

## 2025-01-07 RX ORDER — ACETAMINOPHEN 325 MG/1
325 TABLET ORAL
Qty: 0 | Refills: 0 | Status: COMPLETED
Start: 2024-11-12

## 2025-01-09 LAB
ALBUMIN SERPL ELPH-MCNC: 4.3 G/DL
ALP BLD-CCNC: 66 U/L
ALT SERPL-CCNC: 25 U/L
ANION GAP SERPL CALC-SCNC: 13 MMOL/L
AST SERPL-CCNC: 25 U/L
BASOPHILS # BLD AUTO: 0.07 K/UL
BASOPHILS NFR BLD AUTO: 1 %
BILIRUB SERPL-MCNC: 0.7 MG/DL
BUN SERPL-MCNC: 18 MG/DL
CALCIUM SERPL-MCNC: 9.7 MG/DL
CHLORIDE SERPL-SCNC: 103 MMOL/L
CO2 SERPL-SCNC: 29 MMOL/L
CREAT SERPL-MCNC: 1.1 MG/DL
CRP SERPL-MCNC: 4 MG/L
EGFR: 69 ML/MIN/1.73M2
EOSINOPHIL # BLD AUTO: 0.36 K/UL
EOSINOPHIL NFR BLD AUTO: 4.9 %
ERYTHROCYTE [SEDIMENTATION RATE] IN BLOOD BY WESTERGREN METHOD: 9 MM/HR
GLUCOSE SERPL-MCNC: 106 MG/DL
HCT VFR BLD CALC: 43.7 %
HGB BLD-MCNC: 14.6 G/DL
IMM GRANULOCYTES NFR BLD AUTO: 1 %
LYMPHOCYTES # BLD AUTO: 1.45 K/UL
LYMPHOCYTES NFR BLD AUTO: 19.9 %
MAN DIFF?: NORMAL
MCHC RBC-ENTMCNC: 33.4 G/DL
MCHC RBC-ENTMCNC: 34.8 PG
MCV RBC AUTO: 104 FL
MONOCYTES # BLD AUTO: 0.87 K/UL
MONOCYTES NFR BLD AUTO: 12 %
NEUTROPHILS # BLD AUTO: 4.46 K/UL
NEUTROPHILS NFR BLD AUTO: 61.2 %
PLATELET # BLD AUTO: 243 K/UL
POTASSIUM SERPL-SCNC: 4 MMOL/L
PROT SERPL-MCNC: 6.9 G/DL
RBC # BLD: 4.2 M/UL
RBC # FLD: 13.8 %
SODIUM SERPL-SCNC: 145 MMOL/L
WBC # FLD AUTO: 7.28 K/UL

## 2025-01-16 ENCOUNTER — OFFICE (OUTPATIENT)
Dept: URBAN - METROPOLITAN AREA CLINIC 102 | Facility: CLINIC | Age: 80
Setting detail: OPHTHALMOLOGY
End: 2025-01-16
Payer: MEDICARE

## 2025-01-16 DIAGNOSIS — H16.223: ICD-10-CM

## 2025-01-16 DIAGNOSIS — Z96.1: ICD-10-CM

## 2025-01-16 PROCEDURE — 99024 POSTOP FOLLOW-UP VISIT: CPT | Performed by: OPHTHALMOLOGY

## 2025-01-16 ASSESSMENT — REFRACTION_CURRENTRX
OD_CYLINDER: -1.25
OD_ADD: +2.50
OD_ADD: +2.50
OD_OVR_VA: 20/
OD_AXIS: 65
OD_OVR_VA: 20/
OD_SPHERE: -0.75
OS_VPRISM_DIRECTION: PROGS
OS_AXIS: 110
OD_AXIS: 90
OD_VPRISM_DIRECTION: PROGS
OD_CYLINDER: -1.25
OS_ADD: +2.50
OS_SPHERE: -1.00
OD_OVR_VA: 20/
OS_CYLINDER: -1.00
OS_SPHERE: -0.50
OS_CYLINDER: -2.00
OD_ADD: +2.50
OS_AXIS: 120
OD_AXIS: 80
OS_ADD: +2.50
OD_SPHERE: -1.00
OS_OVR_VA: 20/
OD_CYLINDER: -1.25
OS_OVR_VA: 20/
OD_SPHERE: -1.25
OS_AXIS: 124
OD_VPRISM_DIRECTION: PROGS
OS_CYLINDER: -2.00
OS_VPRISM_DIRECTION: PROGS
OS_SPHERE: -0.50
OS_OVR_VA: 20/
OS_ADD: +2.50

## 2025-01-16 ASSESSMENT — VISUAL ACUITY
OS_BCVA: 20/30-
OD_BCVA: 20/25-1

## 2025-01-16 ASSESSMENT — LID EXAM ASSESSMENTS
OS_BLEPHARITIS: LLL LUL T
OD_BLEPHARITIS: RLL RUL T

## 2025-01-16 ASSESSMENT — REFRACTION_MANIFEST
OS_SPHERE: -0.50
OD_SPHERE: -0.25
OS_AXIS: 110
OS_CYLINDER: -2.00
OD_VA1: 20/30-2
OS_CYLINDER: -1.75
OS_VA1: 20/50-
OD_AXIS: 045
OD_CYLINDER: -1.50
OD_AXIS: 052
OS_VA1: 20/50-2
OS_SPHERE: -0.75
OS_AXIS: 115
OD_CYLINDER: -1.75
OD_VA1: 20/40
OD_SPHERE: -0.25

## 2025-01-16 ASSESSMENT — KERATOMETRY
OD_AXISANGLE_DEGREES: 129
OS_K2POWER_DIOPTERS: 44.75
OS_K1POWER_DIOPTERS: 44.00
OD_K1POWER_DIOPTERS: 44.00
OD_K2POWER_DIOPTERS: 44.50
METHOD_AUTO_MANUAL: AUTO
OS_AXISANGLE_DEGREES: 022

## 2025-01-16 ASSESSMENT — PACHYMETRY
OD_CT_CORRECTION: -1
OS_CT_CORRECTION: -1
OS_CT_UM: 565
OD_CT_UM: 563

## 2025-01-16 ASSESSMENT — REFRACTION_AUTOREFRACTION
OS_AXIS: 104
OD_AXIS: 092
OS_CYLINDER: -1.00
OS_SPHERE: +0.25
OD_CYLINDER: -0.25
OD_SPHERE: +0.25

## 2025-01-16 ASSESSMENT — CONFRONTATIONAL VISUAL FIELD TEST (CVF)
OD_FINDINGS: FULL
OS_FINDINGS: FULL

## 2025-01-16 ASSESSMENT — TONOMETRY
OS_IOP_MMHG: 16
OD_IOP_MMHG: 16

## 2025-02-06 ENCOUNTER — RX RENEWAL (OUTPATIENT)
Age: 80
End: 2025-02-06

## 2025-03-04 ENCOUNTER — APPOINTMENT (OUTPATIENT)
Dept: RHEUMATOLOGY | Facility: CLINIC | Age: 80
End: 2025-03-04
Payer: MEDICARE

## 2025-03-04 VITALS
OXYGEN SATURATION: 96 % | BODY MASS INDEX: 35.2 KG/M2 | TEMPERATURE: 97.1 F | WEIGHT: 219 LBS | HEIGHT: 66 IN | SYSTOLIC BLOOD PRESSURE: 142 MMHG | HEART RATE: 65 BPM | DIASTOLIC BLOOD PRESSURE: 70 MMHG

## 2025-03-04 VITALS
TEMPERATURE: 97 F | SYSTOLIC BLOOD PRESSURE: 158 MMHG | OXYGEN SATURATION: 99 % | DIASTOLIC BLOOD PRESSURE: 84 MMHG | HEART RATE: 65 BPM

## 2025-03-04 DIAGNOSIS — E66.812 OBESITY, CLASS 2: ICD-10-CM

## 2025-03-04 DIAGNOSIS — G47.33 OBSTRUCTIVE SLEEP APNEA (ADULT) (PEDIATRIC): ICD-10-CM

## 2025-03-04 DIAGNOSIS — M47.819 SPONDYLOSIS W/OUT MYELOPATHY OR RADICULOPATHY, SITE UNSPECIFIED: ICD-10-CM

## 2025-03-04 DIAGNOSIS — M54.16 RADICULOPATHY, LUMBAR REGION: ICD-10-CM

## 2025-03-04 PROCEDURE — 99213 OFFICE O/P EST LOW 20 MIN: CPT | Mod: 25

## 2025-03-04 PROCEDURE — ZZZZZ: CPT

## 2025-03-04 PROCEDURE — 96365 THER/PROPH/DIAG IV INF INIT: CPT

## 2025-03-04 PROCEDURE — 36415 COLL VENOUS BLD VENIPUNCTURE: CPT

## 2025-03-04 RX ORDER — ACETAMINOPHEN 325 MG/1
325 TABLET ORAL
Qty: 0 | Refills: 0 | Status: COMPLETED
Start: 2024-11-12

## 2025-03-04 RX ORDER — TIRZEPATIDE 2.5 MG/.5ML
2.5 INJECTION, SOLUTION SUBCUTANEOUS
Refills: 0 | Status: ACTIVE | COMMUNITY

## 2025-03-04 RX ORDER — GOLIMUMAB 50 MG/4ML
50 SOLUTION INTRAVENOUS
Qty: 0 | Refills: 0 | Status: COMPLETED
Start: 2024-11-12

## 2025-03-04 RX ORDER — CETIRIZINE HYDROCHLORIDE 10 MG/1
10 TABLET, COATED ORAL
Qty: 0 | Refills: 0 | Status: COMPLETED
Start: 2024-11-12

## 2025-03-04 RX ORDER — GOLIMUMAB 100 MG/ML
INJECTION, SOLUTION SUBCUTANEOUS
Refills: 0 | Status: ACTIVE | COMMUNITY

## 2025-03-11 LAB
ALBUMIN SERPL ELPH-MCNC: 4.3 G/DL
ALP BLD-CCNC: 64 U/L
ALT SERPL-CCNC: 16 U/L
ANION GAP SERPL CALC-SCNC: 13 MMOL/L
AST SERPL-CCNC: 20 U/L
BASOPHILS # BLD AUTO: 0.06 K/UL
BASOPHILS NFR BLD AUTO: 0.9 %
BILIRUB SERPL-MCNC: 0.7 MG/DL
BUN SERPL-MCNC: 15 MG/DL
CALCIUM SERPL-MCNC: 9.9 MG/DL
CHLORIDE SERPL-SCNC: 103 MMOL/L
CO2 SERPL-SCNC: 29 MMOL/L
CREAT SERPL-MCNC: 1.08 MG/DL
CRP SERPL-MCNC: 4 MG/L
EGFRCR SERPLBLD CKD-EPI 2021: 70 ML/MIN/1.73M2
EOSINOPHIL # BLD AUTO: 0.31 K/UL
EOSINOPHIL NFR BLD AUTO: 4.8 %
ERYTHROCYTE [SEDIMENTATION RATE] IN BLOOD BY WESTERGREN METHOD: 14 MM/HR
GLUCOSE SERPL-MCNC: 92 MG/DL
HCT VFR BLD CALC: 43.4 %
HGB BLD-MCNC: 14.9 G/DL
IMM GRANULOCYTES NFR BLD AUTO: 0.5 %
LYMPHOCYTES # BLD AUTO: 1.52 K/UL
LYMPHOCYTES NFR BLD AUTO: 23.4 %
MAN DIFF?: NORMAL
MCHC RBC-ENTMCNC: 34.3 G/DL
MCHC RBC-ENTMCNC: 34.3 PG
MCV RBC AUTO: 99.8 FL
MONOCYTES # BLD AUTO: 0.73 K/UL
MONOCYTES NFR BLD AUTO: 11.2 %
NEUTROPHILS # BLD AUTO: 3.85 K/UL
NEUTROPHILS NFR BLD AUTO: 59.2 %
PLATELET # BLD AUTO: 246 K/UL
POTASSIUM SERPL-SCNC: 3.9 MMOL/L
PROT SERPL-MCNC: 6.7 G/DL
RBC # BLD: 4.35 M/UL
RBC # FLD: 12.6 %
SODIUM SERPL-SCNC: 145 MMOL/L
WBC # FLD AUTO: 6.5 K/UL

## 2025-04-23 ENCOUNTER — RX ONLY (RX ONLY)
Age: 80
End: 2025-04-23

## 2025-04-29 ENCOUNTER — APPOINTMENT (OUTPATIENT)
Dept: RHEUMATOLOGY | Facility: CLINIC | Age: 80
End: 2025-04-29
Payer: MEDICARE

## 2025-04-29 VITALS
SYSTOLIC BLOOD PRESSURE: 136 MMHG | RESPIRATION RATE: 16 BRPM | DIASTOLIC BLOOD PRESSURE: 72 MMHG | OXYGEN SATURATION: 98 % | TEMPERATURE: 98 F | HEART RATE: 66 BPM

## 2025-04-29 VITALS
HEART RATE: 77 BPM | DIASTOLIC BLOOD PRESSURE: 85 MMHG | TEMPERATURE: 98 F | OXYGEN SATURATION: 98 % | RESPIRATION RATE: 15 BRPM | SYSTOLIC BLOOD PRESSURE: 141 MMHG

## 2025-04-29 PROCEDURE — 96365 THER/PROPH/DIAG IV INF INIT: CPT

## 2025-04-29 RX ORDER — GOLIMUMAB 50 MG/4ML
50 SOLUTION INTRAVENOUS
Qty: 0 | Refills: 0 | Status: COMPLETED
Start: 2024-11-12

## 2025-04-29 RX ORDER — ACETAMINOPHEN 325 MG/1
325 TABLET ORAL
Qty: 0 | Refills: 0 | Status: COMPLETED
Start: 2024-11-12

## 2025-04-29 RX ORDER — CETIRIZINE HYDROCHLORIDE 10 MG/1
10 TABLET, COATED ORAL
Qty: 0 | Refills: 0 | Status: COMPLETED
Start: 2024-11-12

## 2025-05-13 ENCOUNTER — APPOINTMENT (OUTPATIENT)
Dept: RHEUMATOLOGY | Facility: CLINIC | Age: 80
End: 2025-05-13
Payer: MEDICARE

## 2025-05-13 VITALS
BODY MASS INDEX: 33.03 KG/M2 | WEIGHT: 205.5 LBS | OXYGEN SATURATION: 99 % | HEIGHT: 66 IN | DIASTOLIC BLOOD PRESSURE: 70 MMHG | SYSTOLIC BLOOD PRESSURE: 118 MMHG | HEART RATE: 69 BPM | TEMPERATURE: 97.9 F

## 2025-05-13 DIAGNOSIS — M47.819 SPONDYLOSIS W/OUT MYELOPATHY OR RADICULOPATHY, SITE UNSPECIFIED: ICD-10-CM

## 2025-05-13 DIAGNOSIS — E66.811 OBESITY, CLASS 1: ICD-10-CM

## 2025-05-13 DIAGNOSIS — M54.16 RADICULOPATHY, LUMBAR REGION: ICD-10-CM

## 2025-05-13 DIAGNOSIS — M06.00 RHEUMATOID ARTHRITIS W/OUT RHEUMATOID FACTOR, UNSPECIFIED SITE: ICD-10-CM

## 2025-05-13 DIAGNOSIS — G47.33 OBSTRUCTIVE SLEEP APNEA (ADULT) (PEDIATRIC): ICD-10-CM

## 2025-05-13 DIAGNOSIS — E78.1 PURE HYPERGLYCERIDEMIA: ICD-10-CM

## 2025-05-13 DIAGNOSIS — E66.812 OBESITY, CLASS 2: ICD-10-CM

## 2025-05-13 PROCEDURE — 99213 OFFICE O/P EST LOW 20 MIN: CPT

## 2025-05-13 PROCEDURE — G2211 COMPLEX E/M VISIT ADD ON: CPT

## 2025-05-19 PROBLEM — E66.811 OBESITY (BMI 30.0-34.9): Status: ACTIVE | Noted: 2025-05-19

## 2025-05-19 PROBLEM — E66.812 OBESITY, CLASS II, BMI 35-39.9: Status: RESOLVED | Noted: 2024-07-31 | Resolved: 2025-05-19

## 2025-06-24 ENCOUNTER — APPOINTMENT (OUTPATIENT)
Dept: RHEUMATOLOGY | Facility: CLINIC | Age: 80
End: 2025-06-24
Payer: MEDICARE

## 2025-06-24 VITALS — SYSTOLIC BLOOD PRESSURE: 151 MMHG | TEMPERATURE: 97.1 F | DIASTOLIC BLOOD PRESSURE: 76 MMHG | OXYGEN SATURATION: 100 %

## 2025-06-24 VITALS — SYSTOLIC BLOOD PRESSURE: 133 MMHG | HEART RATE: 75 BPM | OXYGEN SATURATION: 98 % | DIASTOLIC BLOOD PRESSURE: 78 MMHG

## 2025-06-24 PROCEDURE — 36415 COLL VENOUS BLD VENIPUNCTURE: CPT

## 2025-06-24 PROCEDURE — 96365 THER/PROPH/DIAG IV INF INIT: CPT

## 2025-06-24 RX ORDER — GOLIMUMAB 50 MG/4ML
50 SOLUTION INTRAVENOUS
Qty: 0 | Refills: 0 | Status: COMPLETED
Start: 2024-11-12

## 2025-06-24 RX ORDER — CETIRIZINE HYDROCHLORIDE 10 MG/1
10 TABLET, COATED ORAL
Qty: 0 | Refills: 0 | Status: COMPLETED
Start: 2024-11-12

## 2025-06-24 RX ORDER — ACETAMINOPHEN 325 MG/1
325 TABLET ORAL
Qty: 0 | Refills: 0 | Status: COMPLETED
Start: 2024-11-12

## 2025-07-01 ENCOUNTER — OFFICE (OUTPATIENT)
Dept: URBAN - METROPOLITAN AREA CLINIC 102 | Facility: CLINIC | Age: 80
Setting detail: OPHTHALMOLOGY
End: 2025-07-01
Payer: MEDICARE

## 2025-07-01 DIAGNOSIS — H01.002: ICD-10-CM

## 2025-07-01 DIAGNOSIS — H35.363: ICD-10-CM

## 2025-07-01 DIAGNOSIS — H01.001: ICD-10-CM

## 2025-07-01 DIAGNOSIS — L40.59: ICD-10-CM

## 2025-07-01 DIAGNOSIS — H40.031: ICD-10-CM

## 2025-07-01 DIAGNOSIS — H01.004: ICD-10-CM

## 2025-07-01 DIAGNOSIS — Z96.1: ICD-10-CM

## 2025-07-01 DIAGNOSIS — H43.811: ICD-10-CM

## 2025-07-01 DIAGNOSIS — H01.005: ICD-10-CM

## 2025-07-01 DIAGNOSIS — H16.223: ICD-10-CM

## 2025-07-01 DIAGNOSIS — H40.023: ICD-10-CM

## 2025-07-01 PROCEDURE — 92133 CPTRZD OPH DX IMG PST SGM ON: CPT | Performed by: OPHTHALMOLOGY

## 2025-07-01 PROCEDURE — 92014 COMPRE OPH EXAM EST PT 1/>: CPT | Performed by: OPHTHALMOLOGY

## 2025-07-01 PROCEDURE — 92083 EXTENDED VISUAL FIELD XM: CPT | Performed by: OPHTHALMOLOGY

## 2025-07-01 ASSESSMENT — REFRACTION_CURRENTRX
OS_OVR_VA: 20/
OD_ADD: +2.50
OS_CYLINDER: -2.00
OS_CYLINDER: -1.00
OD_OVR_VA: 20/
OD_AXIS: 90
OD_AXIS: 65
OD_AXIS: 80
OS_OVR_VA: 20/
OS_VPRISM_DIRECTION: PROGS
OD_VPRISM_DIRECTION: PROGS
OS_CYLINDER: -2.00
OD_SPHERE: -0.75
OS_VPRISM_DIRECTION: PROGS
OD_ADD: +2.50
OS_AXIS: 120
OD_SPHERE: -1.25
OD_CYLINDER: -1.25
OD_ADD: +2.50
OD_CYLINDER: -1.25
OS_SPHERE: -0.50
OD_OVR_VA: 20/
OS_OVR_VA: 20/
OS_ADD: +2.50
OS_AXIS: 110
OD_VPRISM_DIRECTION: PROGS
OS_ADD: +2.50
OD_CYLINDER: -1.25
OS_AXIS: 124
OS_SPHERE: -1.00
OS_SPHERE: -0.50
OD_SPHERE: -1.00
OD_OVR_VA: 20/
OS_ADD: +2.50

## 2025-07-01 ASSESSMENT — REFRACTION_AUTOREFRACTION
OS_AXIS: 105
OS_CYLINDER: -1.25
OD_CYLINDER: -1.00
OD_SPHERE: +0.75
OD_AXIS: 90
OS_SPHERE: +0.75

## 2025-07-01 ASSESSMENT — REFRACTION_MANIFEST
OD_CYLINDER: -1.50
OS_AXIS: 115
OS_VA1: 20/50-2
OS_AXIS: 110
OD_VA1: 20/30-2
OS_SPHERE: -0.50
OD_SPHERE: -0.25
OD_AXIS: 045
OD_AXIS: 052
OS_CYLINDER: -1.75
OS_CYLINDER: -2.00
OD_CYLINDER: -1.75
OD_SPHERE: -0.25
OD_VA1: 20/40
OS_SPHERE: -0.75
OS_VA1: 20/50-

## 2025-07-01 ASSESSMENT — PACHYMETRY
OD_CT_CORRECTION: -1
OS_CT_UM: 565
OD_CT_UM: 563
OS_CT_CORRECTION: -1

## 2025-07-01 ASSESSMENT — CONFRONTATIONAL VISUAL FIELD TEST (CVF)
OD_FINDINGS: FULL
OS_FINDINGS: FULL

## 2025-07-01 ASSESSMENT — VISUAL ACUITY
OS_BCVA: 20/30
OD_BCVA: 20/30

## 2025-07-01 ASSESSMENT — TONOMETRY
OD_IOP_MMHG: 15
OS_IOP_MMHG: 15
OD_IOP_MMHG: 18
OS_IOP_MMHG: 18

## 2025-07-01 ASSESSMENT — KERATOMETRY
OS_K1POWER_DIOPTERS: 43.75
METHOD_AUTO_MANUAL: AUTO
OD_AXISANGLE_DEGREES: 90
OS_AXISANGLE_DEGREES: 019
OD_K2POWER_DIOPTERS: 44.50
OS_K2POWER_DIOPTERS: 44.75
OD_K1POWER_DIOPTERS: 44.25

## 2025-07-01 ASSESSMENT — LID EXAM ASSESSMENTS
OS_BLEPHARITIS: LLL LUL T
OD_BLEPHARITIS: RLL RUL T

## 2025-08-19 ENCOUNTER — APPOINTMENT (OUTPATIENT)
Dept: RHEUMATOLOGY | Facility: CLINIC | Age: 80
End: 2025-08-19
Payer: MEDICARE

## 2025-08-19 VITALS
SYSTOLIC BLOOD PRESSURE: 154 MMHG | OXYGEN SATURATION: 98 % | TEMPERATURE: 97.8 F | RESPIRATION RATE: 18 BRPM | DIASTOLIC BLOOD PRESSURE: 85 MMHG | HEART RATE: 71 BPM

## 2025-08-19 VITALS
HEART RATE: 63 BPM | RESPIRATION RATE: 18 BRPM | DIASTOLIC BLOOD PRESSURE: 82 MMHG | OXYGEN SATURATION: 98 % | SYSTOLIC BLOOD PRESSURE: 165 MMHG

## 2025-08-19 PROCEDURE — 96365 THER/PROPH/DIAG IV INF INIT: CPT

## 2025-08-19 PROCEDURE — 36415 COLL VENOUS BLD VENIPUNCTURE: CPT

## 2025-08-19 RX ORDER — CETIRIZINE HYDROCHLORIDE 10 MG/1
10 TABLET, COATED ORAL
Qty: 0 | Refills: 0 | Status: COMPLETED
Start: 2024-11-12

## 2025-08-19 RX ORDER — GOLIMUMAB 50 MG/4ML
50 SOLUTION INTRAVENOUS
Qty: 0 | Refills: 0 | Status: COMPLETED
Start: 2024-11-12

## 2025-08-19 RX ORDER — ACETAMINOPHEN 325 MG/1
325 TABLET ORAL
Qty: 0 | Refills: 0 | Status: COMPLETED
Start: 2024-11-12

## 2025-08-20 LAB
ALBUMIN SERPL ELPH-MCNC: 4.2 G/DL
ALP BLD-CCNC: 60 U/L
ALT SERPL-CCNC: 21 U/L
ANION GAP SERPL CALC-SCNC: 13 MMOL/L
AST SERPL-CCNC: 31 U/L
BASOPHILS # BLD AUTO: 0.06 K/UL
BASOPHILS NFR BLD AUTO: 0.9 %
BILIRUB SERPL-MCNC: 0.6 MG/DL
BUN SERPL-MCNC: 13 MG/DL
CALCIUM SERPL-MCNC: 9.8 MG/DL
CHLORIDE SERPL-SCNC: 104 MMOL/L
CO2 SERPL-SCNC: 26 MMOL/L
CREAT SERPL-MCNC: 0.99 MG/DL
CRP SERPL-MCNC: 4 MG/L
EGFRCR SERPLBLD CKD-EPI 2021: 78 ML/MIN/1.73M2
EOSINOPHIL # BLD AUTO: 0.25 K/UL
EOSINOPHIL NFR BLD AUTO: 3.8 %
ERYTHROCYTE [SEDIMENTATION RATE] IN BLOOD BY WESTERGREN METHOD: 23 MM/HR
GLUCOSE SERPL-MCNC: 88 MG/DL
HCT VFR BLD CALC: 41 %
HGB BLD-MCNC: 13.6 G/DL
IMM GRANULOCYTES NFR BLD AUTO: 0.6 %
LYMPHOCYTES # BLD AUTO: 1.34 K/UL
LYMPHOCYTES NFR BLD AUTO: 20.4 %
MAN DIFF?: NORMAL
MCHC RBC-ENTMCNC: 33.2 G/DL
MCHC RBC-ENTMCNC: 34.1 PG
MCV RBC AUTO: 102.8 FL
MONOCYTES # BLD AUTO: 0.75 K/UL
MONOCYTES NFR BLD AUTO: 11.4 %
NEUTROPHILS # BLD AUTO: 4.13 K/UL
NEUTROPHILS NFR BLD AUTO: 62.9 %
PLATELET # BLD AUTO: 265 K/UL
POTASSIUM SERPL-SCNC: 3.9 MMOL/L
PROT SERPL-MCNC: 6.8 G/DL
RBC # BLD: 3.99 M/UL
RBC # FLD: 13.3 %
SODIUM SERPL-SCNC: 143 MMOL/L
WBC # FLD AUTO: 6.57 K/UL

## 2025-08-27 PROBLEM — H40.1131 POAG; BOTH EYES MILD: Status: ACTIVE | Noted: 2025-08-27
